# Patient Record
Sex: FEMALE | Race: WHITE | NOT HISPANIC OR LATINO | Employment: FULL TIME | URBAN - METROPOLITAN AREA
[De-identification: names, ages, dates, MRNs, and addresses within clinical notes are randomized per-mention and may not be internally consistent; named-entity substitution may affect disease eponyms.]

---
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
## 2021-01-26 DIAGNOSIS — O36.5920 MATERNAL CARE FOR POOR FETAL GROWTH IN SECOND TRIMESTER, SINGLE OR UNSPECIFIED FETUS: Primary | ICD-10-CM

## 2021-01-27 ENCOUNTER — PROCEDURE VISIT (OUTPATIENT)
Dept: MATERNAL FETAL MEDICINE | Facility: CLINIC | Age: 21
End: 2021-01-27
Payer: MEDICAID

## 2021-01-27 ENCOUNTER — OFFICE VISIT (OUTPATIENT)
Dept: MATERNAL FETAL MEDICINE | Facility: CLINIC | Age: 21
End: 2021-01-27
Payer: MEDICAID

## 2021-01-27 VITALS
SYSTOLIC BLOOD PRESSURE: 100 MMHG | WEIGHT: 178.56 LBS | BODY MASS INDEX: 27.97 KG/M2 | DIASTOLIC BLOOD PRESSURE: 70 MMHG

## 2021-01-27 DIAGNOSIS — Z03.74 SUSPECTED PROBLEM WITH FETAL GROWTH NOT FOUND: ICD-10-CM

## 2021-01-27 DIAGNOSIS — O36.5920 MATERNAL CARE FOR POOR FETAL GROWTH IN SECOND TRIMESTER, SINGLE OR UNSPECIFIED FETUS: ICD-10-CM

## 2021-01-27 PROCEDURE — 76811 PR US, OB FETAL EVAL & EXAM, TRANSABDOM,FIRST GESTATION: ICD-10-PCS | Mod: 26,S$PBB,, | Performed by: OBSTETRICS & GYNECOLOGY

## 2021-01-27 PROCEDURE — 76811 OB US DETAILED SNGL FETUS: CPT | Mod: PBBFAC | Performed by: OBSTETRICS & GYNECOLOGY

## 2021-01-27 PROCEDURE — 99999 PR PBB SHADOW E&M-EST. PATIENT-LVL III: ICD-10-PCS | Mod: PBBFAC,,, | Performed by: OBSTETRICS & GYNECOLOGY

## 2021-01-27 PROCEDURE — 99999 PR PBB SHADOW E&M-EST. PATIENT-LVL III: CPT | Mod: PBBFAC,,, | Performed by: OBSTETRICS & GYNECOLOGY

## 2021-01-27 PROCEDURE — 99203 OFFICE O/P NEW LOW 30 MIN: CPT | Mod: 25,S$PBB,, | Performed by: OBSTETRICS & GYNECOLOGY

## 2021-01-27 PROCEDURE — 99203 PR OFFICE/OUTPT VISIT, NEW, LEVL III, 30-44 MIN: ICD-10-PCS | Mod: 25,S$PBB,, | Performed by: OBSTETRICS & GYNECOLOGY

## 2021-01-27 PROCEDURE — 76811 OB US DETAILED SNGL FETUS: CPT | Mod: 26,S$PBB,, | Performed by: OBSTETRICS & GYNECOLOGY

## 2021-01-27 PROCEDURE — 99213 OFFICE O/P EST LOW 20 MIN: CPT | Mod: PBBFAC,25 | Performed by: OBSTETRICS & GYNECOLOGY

## 2021-02-24 ENCOUNTER — OFFICE VISIT (OUTPATIENT)
Dept: MATERNAL FETAL MEDICINE | Facility: CLINIC | Age: 21
End: 2021-02-24
Payer: MEDICAID

## 2021-02-24 ENCOUNTER — PROCEDURE VISIT (OUTPATIENT)
Dept: MATERNAL FETAL MEDICINE | Facility: CLINIC | Age: 21
End: 2021-02-24
Payer: MEDICAID

## 2021-02-24 VITALS
SYSTOLIC BLOOD PRESSURE: 104 MMHG | DIASTOLIC BLOOD PRESSURE: 64 MMHG | BODY MASS INDEX: 28.76 KG/M2 | WEIGHT: 183.63 LBS

## 2021-02-24 DIAGNOSIS — O36.5920 MATERNAL CARE FOR POOR FETAL GROWTH IN SECOND TRIMESTER, SINGLE OR UNSPECIFIED FETUS: ICD-10-CM

## 2021-02-24 DIAGNOSIS — Z03.74 SUSPECTED PROBLEM WITH FETAL GROWTH NOT FOUND: ICD-10-CM

## 2021-02-24 PROCEDURE — 99999 PR PBB SHADOW E&M-EST. PATIENT-LVL III: ICD-10-PCS | Mod: PBBFAC,,, | Performed by: OBSTETRICS & GYNECOLOGY

## 2021-02-24 PROCEDURE — 99214 OFFICE O/P EST MOD 30 MIN: CPT | Mod: 25,S$PBB,, | Performed by: OBSTETRICS & GYNECOLOGY

## 2021-02-24 PROCEDURE — 76819 PR US, OB, FETAL BIOPHYSICAL, W/O NST: ICD-10-PCS | Mod: 26,S$PBB,, | Performed by: OBSTETRICS & GYNECOLOGY

## 2021-02-24 PROCEDURE — 76819 FETAL BIOPHYS PROFIL W/O NST: CPT | Mod: 26,S$PBB,, | Performed by: OBSTETRICS & GYNECOLOGY

## 2021-02-24 PROCEDURE — 99999 PR PBB SHADOW E&M-EST. PATIENT-LVL III: CPT | Mod: PBBFAC,,, | Performed by: OBSTETRICS & GYNECOLOGY

## 2021-02-24 PROCEDURE — 99213 OFFICE O/P EST LOW 20 MIN: CPT | Mod: PBBFAC,25 | Performed by: OBSTETRICS & GYNECOLOGY

## 2021-02-24 PROCEDURE — 76816 OB US FOLLOW-UP PER FETUS: CPT | Mod: PBBFAC | Performed by: OBSTETRICS & GYNECOLOGY

## 2021-02-24 PROCEDURE — 76819 FETAL BIOPHYS PROFIL W/O NST: CPT | Mod: PBBFAC | Performed by: OBSTETRICS & GYNECOLOGY

## 2021-02-24 PROCEDURE — 76816 OB US FOLLOW-UP PER FETUS: CPT | Mod: 26,S$PBB,, | Performed by: OBSTETRICS & GYNECOLOGY

## 2021-02-24 PROCEDURE — 76816 PR  US,PREGNANT UTERUS,F/U,TRANSABD APP: ICD-10-PCS | Mod: 26,S$PBB,, | Performed by: OBSTETRICS & GYNECOLOGY

## 2021-02-24 PROCEDURE — 99214 PR OFFICE/OUTPT VISIT, EST, LEVL IV, 30-39 MIN: ICD-10-PCS | Mod: 25,S$PBB,, | Performed by: OBSTETRICS & GYNECOLOGY

## 2021-03-12 ENCOUNTER — HOSPITAL ENCOUNTER (OUTPATIENT)
Facility: HOSPITAL | Age: 21
Discharge: HOME | End: 2021-03-12
Attending: SPECIALIST | Admitting: SPECIALIST
Payer: MEDICAID

## 2021-03-12 VITALS — HEART RATE: 82 BPM | DIASTOLIC BLOOD PRESSURE: 63 MMHG | SYSTOLIC BLOOD PRESSURE: 117 MMHG

## 2021-03-12 DIAGNOSIS — N93.9 VAGINAL BLEEDING: ICD-10-CM

## 2021-03-12 PROCEDURE — 99211 OFF/OP EST MAY X REQ PHY/QHP: CPT

## 2021-03-12 RX ORDER — PROCHLORPERAZINE EDISYLATE 5 MG/ML
5 INJECTION INTRAMUSCULAR; INTRAVENOUS EVERY 6 HOURS PRN
Status: DISCONTINUED | OUTPATIENT
Start: 2021-03-12 | End: 2021-03-12 | Stop reason: HOSPADM

## 2021-03-31 LAB — PRENATAL STREP B CULTURE: NEGATIVE

## 2021-04-05 ENCOUNTER — HOSPITAL ENCOUNTER (OUTPATIENT)
Facility: HOSPITAL | Age: 21
Discharge: HOME | End: 2021-04-06
Attending: SPECIALIST | Admitting: OBSTETRICS & GYNECOLOGY
Payer: MEDICAID

## 2021-04-05 DIAGNOSIS — O26.899 ABDOMINAL PAIN IN PREGNANCY: ICD-10-CM

## 2021-04-05 DIAGNOSIS — R10.9 ABDOMINAL PAIN IN PREGNANCY: ICD-10-CM

## 2021-04-05 LAB
BACTERIA #/AREA URNS HPF: ABNORMAL /HPF
BILIRUB UR QL STRIP: NEGATIVE
CLARITY UR: ABNORMAL
COLOR UR: YELLOW
GLUCOSE UR QL STRIP: NEGATIVE
HGB UR QL STRIP: ABNORMAL
HYALINE CASTS #/AREA URNS LPF: 34 /LPF
KETONES UR QL STRIP: ABNORMAL
LEUKOCYTE ESTERASE UR QL STRIP: ABNORMAL
MICROSCOPIC COMMENT: ABNORMAL
NITRITE UR QL STRIP: POSITIVE
PH UR STRIP: 7 [PH] (ref 5–8)
PROT UR QL STRIP: ABNORMAL
RBC #/AREA URNS HPF: 23 /HPF (ref 0–4)
SP GR UR STRIP: 1.01 (ref 1–1.03)
SQUAMOUS #/AREA URNS HPF: 1 /HPF
URN SPEC COLLECT METH UR: ABNORMAL
UROBILINOGEN UR STRIP-ACNC: NEGATIVE EU/DL
WBC #/AREA URNS HPF: >100 /HPF (ref 0–5)

## 2021-04-05 PROCEDURE — 87147 CULTURE TYPE IMMUNOLOGIC: CPT | Mod: 59 | Performed by: OBSTETRICS & GYNECOLOGY

## 2021-04-05 PROCEDURE — 87077 CULTURE AEROBIC IDENTIFY: CPT | Performed by: OBSTETRICS & GYNECOLOGY

## 2021-04-05 PROCEDURE — 87086 URINE CULTURE/COLONY COUNT: CPT | Performed by: OBSTETRICS & GYNECOLOGY

## 2021-04-05 PROCEDURE — 25000003 PHARM REV CODE 250: Performed by: OBSTETRICS & GYNECOLOGY

## 2021-04-05 PROCEDURE — 63600175 PHARM REV CODE 636 W HCPCS: Performed by: OBSTETRICS & GYNECOLOGY

## 2021-04-05 PROCEDURE — 99211 OFF/OP EST MAY X REQ PHY/QHP: CPT

## 2021-04-05 PROCEDURE — 81001 URINALYSIS AUTO W/SCOPE: CPT | Performed by: OBSTETRICS & GYNECOLOGY

## 2021-04-05 PROCEDURE — 87186 SC STD MICRODIL/AGAR DIL: CPT | Performed by: OBSTETRICS & GYNECOLOGY

## 2021-04-05 RX ORDER — SODIUM CHLORIDE, SODIUM LACTATE, POTASSIUM CHLORIDE, CALCIUM CHLORIDE 600; 310; 30; 20 MG/100ML; MG/100ML; MG/100ML; MG/100ML
INJECTION, SOLUTION INTRAVENOUS CONTINUOUS
Status: DISCONTINUED | OUTPATIENT
Start: 2021-04-05 | End: 2021-04-06 | Stop reason: HOSPADM

## 2021-04-05 RX ORDER — ACETAMINOPHEN 325 MG/1
325 TABLET ORAL EVERY 6 HOURS PRN
Status: ON HOLD | COMMUNITY
End: 2021-04-23 | Stop reason: HOSPADM

## 2021-04-05 RX ORDER — CEFAZOLIN SODIUM 1 G/50ML
1 SOLUTION INTRAVENOUS
Status: DISCONTINUED | OUTPATIENT
Start: 2021-04-05 | End: 2021-04-06 | Stop reason: HOSPADM

## 2021-04-05 RX ORDER — NALBUPHINE HYDROCHLORIDE 10 MG/ML
10 INJECTION, SOLUTION INTRAMUSCULAR; INTRAVENOUS; SUBCUTANEOUS
Status: DISCONTINUED | OUTPATIENT
Start: 2021-04-05 | End: 2021-04-06 | Stop reason: HOSPADM

## 2021-04-05 RX ADMIN — NALBUPHINE HYDROCHLORIDE 10 MG: 10 INJECTION, SOLUTION INTRAMUSCULAR; INTRAVENOUS; SUBCUTANEOUS at 08:04

## 2021-04-05 RX ADMIN — SODIUM CHLORIDE, SODIUM LACTATE, POTASSIUM CHLORIDE, AND CALCIUM CHLORIDE: .6; .31; .03; .02 INJECTION, SOLUTION INTRAVENOUS at 08:04

## 2021-04-05 RX ADMIN — PROMETHAZINE HYDROCHLORIDE 12.5 MG: 25 INJECTION INTRAMUSCULAR; INTRAVENOUS at 08:04

## 2021-04-05 RX ADMIN — CEFAZOLIN SODIUM 1 G: 1 SOLUTION INTRAVENOUS at 09:04

## 2021-04-06 VITALS
SYSTOLIC BLOOD PRESSURE: 109 MMHG | DIASTOLIC BLOOD PRESSURE: 59 MMHG | TEMPERATURE: 98 F | WEIGHT: 193 LBS | BODY MASS INDEX: 30.29 KG/M2 | HEART RATE: 85 BPM | HEIGHT: 67 IN | OXYGEN SATURATION: 98 % | RESPIRATION RATE: 17 BRPM

## 2021-04-06 PROCEDURE — 63600175 PHARM REV CODE 636 W HCPCS: Performed by: OBSTETRICS & GYNECOLOGY

## 2021-04-06 PROCEDURE — 99211 OFF/OP EST MAY X REQ PHY/QHP: CPT

## 2021-04-06 RX ADMIN — CEFAZOLIN SODIUM 1 G: 1 SOLUTION INTRAVENOUS at 05:04

## 2021-04-07 LAB — BACTERIA UR CULT: ABNORMAL

## 2021-04-14 ENCOUNTER — HOSPITAL ENCOUNTER (OUTPATIENT)
Facility: HOSPITAL | Age: 21
Discharge: HOME | End: 2021-04-14
Attending: SPECIALIST | Admitting: SPECIALIST
Payer: MEDICAID

## 2021-04-14 VITALS — SYSTOLIC BLOOD PRESSURE: 112 MMHG | DIASTOLIC BLOOD PRESSURE: 73 MMHG | HEART RATE: 78 BPM

## 2021-04-14 DIAGNOSIS — O36.5990 IUGR, ANTENATAL: ICD-10-CM

## 2021-04-14 PROCEDURE — 59025 FETAL NON-STRESS TEST: CPT

## 2021-04-19 ENCOUNTER — HOSPITAL ENCOUNTER (OUTPATIENT)
Facility: HOSPITAL | Age: 21
Discharge: HOME | End: 2021-04-19
Attending: SPECIALIST | Admitting: SPECIALIST
Payer: MEDICAID

## 2021-04-19 VITALS — HEART RATE: 103 BPM | SYSTOLIC BLOOD PRESSURE: 117 MMHG | DIASTOLIC BLOOD PRESSURE: 76 MMHG | TEMPERATURE: 98 F

## 2021-04-19 DIAGNOSIS — N89.8 VAGINAL DISCHARGE: ICD-10-CM

## 2021-04-19 PROCEDURE — 99211 OFF/OP EST MAY X REQ PHY/QHP: CPT

## 2021-04-22 ENCOUNTER — ANESTHESIA (OUTPATIENT)
Dept: OBSTETRICS AND GYNECOLOGY | Facility: HOSPITAL | Age: 21
End: 2021-04-22
Payer: MEDICAID

## 2021-04-22 ENCOUNTER — ANESTHESIA EVENT (OUTPATIENT)
Dept: OBSTETRICS AND GYNECOLOGY | Facility: HOSPITAL | Age: 21
End: 2021-04-22
Payer: MEDICAID

## 2021-04-22 ENCOUNTER — HOSPITAL ENCOUNTER (INPATIENT)
Facility: HOSPITAL | Age: 21
LOS: 2 days | Discharge: HOME | End: 2021-04-24
Attending: SPECIALIST | Admitting: SPECIALIST
Payer: MEDICAID

## 2021-04-22 DIAGNOSIS — Z34.90 ENCOUNTER FOR ELECTIVE INDUCTION OF LABOR: ICD-10-CM

## 2021-04-22 LAB
ABO + RH BLD: NORMAL
AMPHET+METHAMPHET UR QL: NEGATIVE
BACTERIA #/AREA URNS HPF: ABNORMAL /HPF
BARBITURATES UR QL SCN>200 NG/ML: NEGATIVE
BASOPHILS # BLD AUTO: 0.03 K/UL (ref 0–0.2)
BASOPHILS NFR BLD: 0.3 % (ref 0–1.9)
BENZODIAZ UR QL SCN>200 NG/ML: NEGATIVE
BILIRUB UR QL STRIP: NEGATIVE
BLD GP AB SCN CELLS X3 SERPL QL: NORMAL
BUPRENORPHINE UR QL: NEGATIVE
BZE UR QL SCN: NEGATIVE
CANNABINOIDS UR QL SCN: NORMAL
CLARITY UR: CLEAR
COLOR UR: YELLOW
CREAT UR-MCNC: 108 MG/DL (ref 15–325)
DIFFERENTIAL METHOD: ABNORMAL
EOSINOPHIL # BLD AUTO: 0.1 K/UL (ref 0–0.5)
EOSINOPHIL NFR BLD: 0.7 % (ref 0–8)
ERYTHROCYTE [DISTWIDTH] IN BLOOD BY AUTOMATED COUNT: 12.8 % (ref 11.5–14.5)
GLUCOSE UR QL STRIP: NEGATIVE
HCT VFR BLD AUTO: 32.7 % (ref 37–48.5)
HGB BLD-MCNC: 10.8 G/DL (ref 12–16)
HGB UR QL STRIP: ABNORMAL
HYALINE CASTS #/AREA URNS LPF: 0 /LPF
IMM GRANULOCYTES # BLD AUTO: 0.08 K/UL (ref 0–0.04)
IMM GRANULOCYTES NFR BLD AUTO: 0.7 % (ref 0–0.5)
KETONES UR QL STRIP: ABNORMAL
LEUKOCYTE ESTERASE UR QL STRIP: ABNORMAL
LYMPHOCYTES # BLD AUTO: 2.5 K/UL (ref 1–4.8)
LYMPHOCYTES NFR BLD: 23.6 % (ref 18–48)
MCH RBC QN AUTO: 26.7 PG (ref 27–31)
MCHC RBC AUTO-ENTMCNC: 33 G/DL (ref 32–36)
MCV RBC AUTO: 81 FL (ref 82–98)
MICROSCOPIC COMMENT: ABNORMAL
MONOCYTES # BLD AUTO: 1 K/UL (ref 0.3–1)
MONOCYTES NFR BLD: 9.1 % (ref 4–15)
NEUTROPHILS # BLD AUTO: 7 K/UL (ref 1.8–7.7)
NEUTROPHILS NFR BLD: 65.6 % (ref 38–73)
NITRITE UR QL STRIP: NEGATIVE
NRBC BLD-RTO: 0 /100 WBC
OPIATES UR QL SCN: NEGATIVE
PCP UR QL SCN>25 NG/ML: NEGATIVE
PH UR STRIP: 7 [PH] (ref 5–8)
PLATELET # BLD AUTO: 328 K/UL (ref 150–450)
PMV BLD AUTO: 10.7 FL (ref 9.2–12.9)
PROT UR QL STRIP: ABNORMAL
RBC # BLD AUTO: 4.04 M/UL (ref 4–5.4)
RBC #/AREA URNS HPF: 10 /HPF (ref 0–4)
RPR SER QL: NORMAL
SARS-COV-2 RDRP RESP QL NAA+PROBE: NEGATIVE
SP GR UR STRIP: 1.02 (ref 1–1.03)
SQUAMOUS #/AREA URNS HPF: 1 /HPF
TOXICOLOGY INFORMATION: NORMAL
URN SPEC COLLECT METH UR: ABNORMAL
UROBILINOGEN UR STRIP-ACNC: 1 EU/DL
WBC # BLD AUTO: 10.72 K/UL (ref 3.9–12.7)
WBC #/AREA URNS HPF: 20 /HPF (ref 0–5)

## 2021-04-22 PROCEDURE — U0002 COVID-19 LAB TEST NON-CDC: HCPCS | Performed by: SPECIALIST

## 2021-04-22 PROCEDURE — 63600175 PHARM REV CODE 636 W HCPCS: Performed by: SPECIALIST

## 2021-04-22 PROCEDURE — 72200004 HC VAGINAL DELIVERY LEVEL I

## 2021-04-22 PROCEDURE — 87077 CULTURE AEROBIC IDENTIFY: CPT | Performed by: SPECIALIST

## 2021-04-22 PROCEDURE — 25000003 PHARM REV CODE 250: Performed by: SPECIALIST

## 2021-04-22 PROCEDURE — 86592 SYPHILIS TEST NON-TREP QUAL: CPT | Performed by: SPECIALIST

## 2021-04-22 PROCEDURE — 85025 COMPLETE CBC W/AUTO DIFF WBC: CPT | Performed by: SPECIALIST

## 2021-04-22 PROCEDURE — 62326 NJX INTERLAMINAR LMBR/SAC: CPT | Performed by: ANESTHESIOLOGY

## 2021-04-22 PROCEDURE — 86900 BLOOD TYPING SEROLOGIC ABO: CPT | Performed by: SPECIALIST

## 2021-04-22 PROCEDURE — 87086 URINE CULTURE/COLONY COUNT: CPT | Performed by: SPECIALIST

## 2021-04-22 PROCEDURE — 63600175 PHARM REV CODE 636 W HCPCS: Performed by: ANESTHESIOLOGY

## 2021-04-22 PROCEDURE — 80307 DRUG TEST PRSMV CHEM ANLYZR: CPT | Performed by: SPECIALIST

## 2021-04-22 PROCEDURE — 81001 URINALYSIS AUTO W/SCOPE: CPT | Performed by: SPECIALIST

## 2021-04-22 PROCEDURE — 87186 SC STD MICRODIL/AGAR DIL: CPT | Performed by: SPECIALIST

## 2021-04-22 PROCEDURE — 12000002 HC ACUTE/MED SURGE SEMI-PRIVATE ROOM

## 2021-04-22 RX ORDER — OXYCODONE AND ACETAMINOPHEN 5; 325 MG/1; MG/1
1 TABLET ORAL EVERY 4 HOURS PRN
Status: DISCONTINUED | OUTPATIENT
Start: 2021-04-22 | End: 2021-04-24 | Stop reason: HOSPADM

## 2021-04-22 RX ORDER — DIPHENHYDRAMINE HCL 25 MG
25 CAPSULE ORAL EVERY 4 HOURS PRN
Status: DISCONTINUED | OUTPATIENT
Start: 2021-04-22 | End: 2021-04-24 | Stop reason: HOSPADM

## 2021-04-22 RX ORDER — FENTANYL/BUPIVACAINE/NS/PF 2-625MCG/1
14 PLASTIC BAG, INJECTION (ML) INJECTION CONTINUOUS
Status: DISCONTINUED | OUTPATIENT
Start: 2021-04-22 | End: 2021-04-22

## 2021-04-22 RX ORDER — SODIUM CHLORIDE 9 MG/ML
INJECTION, SOLUTION INTRAVENOUS
Status: DISCONTINUED | OUTPATIENT
Start: 2021-04-22 | End: 2021-04-22

## 2021-04-22 RX ORDER — ONDANSETRON 2 MG/ML
4 INJECTION INTRAMUSCULAR; INTRAVENOUS EVERY 6 HOURS PRN
Status: DISCONTINUED | OUTPATIENT
Start: 2021-04-22 | End: 2021-04-22

## 2021-04-22 RX ORDER — OXYTOCIN-SODIUM CHLORIDE 0.9% IV SOLN 30 UNIT/500ML 30-0.9/5 UT/ML-%
30 SOLUTION INTRAVENOUS ONCE
Status: DISCONTINUED | OUTPATIENT
Start: 2021-04-22 | End: 2021-04-24 | Stop reason: HOSPADM

## 2021-04-22 RX ORDER — NALOXONE HCL 0.4 MG/ML
0.4 VIAL (ML) INJECTION SEE ADMIN INSTRUCTIONS
Status: DISCONTINUED | OUTPATIENT
Start: 2021-04-22 | End: 2021-04-22

## 2021-04-22 RX ORDER — METHYLERGONOVINE MALEATE 0.2 MG/ML
200 INJECTION INTRAVENOUS
Status: DISCONTINUED | OUTPATIENT
Start: 2021-04-22 | End: 2021-04-22

## 2021-04-22 RX ORDER — FENTANYL/BUPIVACAINE/NS/PF 2-625MCG/1
PLASTIC BAG, INJECTION (ML) INJECTION
Status: DISPENSED
Start: 2021-04-22 | End: 2021-04-23

## 2021-04-22 RX ORDER — OXYTOCIN-SODIUM CHLORIDE 0.9% IV SOLN 30 UNIT/500ML 30-0.9/5 UT/ML-%
0-30 SOLUTION INTRAVENOUS CONTINUOUS
Status: DISCONTINUED | OUTPATIENT
Start: 2021-04-22 | End: 2021-04-22

## 2021-04-22 RX ORDER — DOCUSATE SODIUM 100 MG/1
200 CAPSULE, LIQUID FILLED ORAL 2 TIMES DAILY PRN
Status: DISCONTINUED | OUTPATIENT
Start: 2021-04-22 | End: 2021-04-24 | Stop reason: HOSPADM

## 2021-04-22 RX ORDER — SODIUM CHLORIDE, SODIUM LACTATE, POTASSIUM CHLORIDE, CALCIUM CHLORIDE 600; 310; 30; 20 MG/100ML; MG/100ML; MG/100ML; MG/100ML
INJECTION, SOLUTION INTRAVENOUS CONTINUOUS
Status: DISCONTINUED | OUTPATIENT
Start: 2021-04-22 | End: 2021-04-22

## 2021-04-22 RX ORDER — DIPHENHYDRAMINE HYDROCHLORIDE 50 MG/ML
12.5 INJECTION INTRAMUSCULAR; INTRAVENOUS EVERY 4 HOURS PRN
Status: DISCONTINUED | OUTPATIENT
Start: 2021-04-22 | End: 2021-04-22

## 2021-04-22 RX ORDER — PROMETHAZINE HYDROCHLORIDE 25 MG/1
25 TABLET ORAL EVERY 6 HOURS PRN
Status: DISCONTINUED | OUTPATIENT
Start: 2021-04-22 | End: 2021-04-24 | Stop reason: HOSPADM

## 2021-04-22 RX ORDER — BUTORPHANOL TARTRATE 2 MG/ML
2 INJECTION INTRAMUSCULAR; INTRAVENOUS ONCE
Status: COMPLETED | OUTPATIENT
Start: 2021-04-22 | End: 2021-04-22

## 2021-04-22 RX ORDER — EPHEDRINE SULFATE 50 MG/ML
10 INJECTION, SOLUTION INTRAVENOUS ONCE AS NEEDED
Status: DISCONTINUED | OUTPATIENT
Start: 2021-04-22 | End: 2021-04-22

## 2021-04-22 RX ORDER — DIPHENHYDRAMINE HYDROCHLORIDE 50 MG/ML
25 INJECTION INTRAMUSCULAR; INTRAVENOUS EVERY 4 HOURS PRN
Status: DISCONTINUED | OUTPATIENT
Start: 2021-04-22 | End: 2021-04-24 | Stop reason: HOSPADM

## 2021-04-22 RX ORDER — CARBOPROST TROMETHAMINE 250 UG/ML
250 INJECTION, SOLUTION INTRAMUSCULAR
Status: DISCONTINUED | OUTPATIENT
Start: 2021-04-22 | End: 2021-04-22

## 2021-04-22 RX ORDER — OXYCODONE AND ACETAMINOPHEN 10; 325 MG/1; MG/1
1 TABLET ORAL EVERY 4 HOURS PRN
Status: DISCONTINUED | OUTPATIENT
Start: 2021-04-22 | End: 2021-04-24 | Stop reason: HOSPADM

## 2021-04-22 RX ORDER — ROPIVACAINE HYDROCHLORIDE 2 MG/ML
INJECTION, SOLUTION EPIDURAL; INFILTRATION
Status: DISCONTINUED | OUTPATIENT
Start: 2021-04-22 | End: 2021-04-22

## 2021-04-22 RX ORDER — HYDROCORTISONE 25 MG/G
CREAM TOPICAL 3 TIMES DAILY PRN
Status: DISCONTINUED | OUTPATIENT
Start: 2021-04-22 | End: 2021-04-24 | Stop reason: HOSPADM

## 2021-04-22 RX ADMIN — ROPIVACAINE HYDROCHLORIDE 5 ML: 2 INJECTION, SOLUTION EPIDURAL; INFILTRATION at 01:04

## 2021-04-22 RX ADMIN — PROMETHAZINE HYDROCHLORIDE 12.5 MG: 25 INJECTION INTRAMUSCULAR; INTRAVENOUS at 09:04

## 2021-04-22 RX ADMIN — Medication 2 MILLI-UNITS/MIN: at 09:04

## 2021-04-22 RX ADMIN — BUTORPHANOL TARTRATE 2 MG: 2 INJECTION, SOLUTION INTRAMUSCULAR; INTRAVENOUS at 09:04

## 2021-04-22 RX ADMIN — SODIUM CHLORIDE, SODIUM LACTATE, POTASSIUM CHLORIDE, AND CALCIUM CHLORIDE: .6; .31; .03; .02 INJECTION, SOLUTION INTRAVENOUS at 09:04

## 2021-04-22 RX ADMIN — IBUPROFEN 600 MG: 400 TABLET, FILM COATED ORAL at 09:04

## 2021-04-23 LAB
BASOPHILS # BLD AUTO: 0.02 K/UL (ref 0–0.2)
BASOPHILS NFR BLD: 0.1 % (ref 0–1.9)
DIFFERENTIAL METHOD: ABNORMAL
EOSINOPHIL # BLD AUTO: 0 K/UL (ref 0–0.5)
EOSINOPHIL NFR BLD: 0.3 % (ref 0–8)
ERYTHROCYTE [DISTWIDTH] IN BLOOD BY AUTOMATED COUNT: 12.7 % (ref 11.5–14.5)
HCT VFR BLD AUTO: 31 % (ref 37–48.5)
HGB BLD-MCNC: 10.2 G/DL (ref 12–16)
IMM GRANULOCYTES # BLD AUTO: 0.08 K/UL (ref 0–0.04)
IMM GRANULOCYTES NFR BLD AUTO: 0.6 % (ref 0–0.5)
LYMPHOCYTES # BLD AUTO: 2 K/UL (ref 1–4.8)
LYMPHOCYTES NFR BLD: 14.3 % (ref 18–48)
MCH RBC QN AUTO: 26.9 PG (ref 27–31)
MCHC RBC AUTO-ENTMCNC: 32.9 G/DL (ref 32–36)
MCV RBC AUTO: 82 FL (ref 82–98)
MONOCYTES # BLD AUTO: 1.3 K/UL (ref 0.3–1)
MONOCYTES NFR BLD: 8.8 % (ref 4–15)
NEUTROPHILS # BLD AUTO: 10.9 K/UL (ref 1.8–7.7)
NEUTROPHILS NFR BLD: 75.9 % (ref 38–73)
NRBC BLD-RTO: 0 /100 WBC
PLATELET # BLD AUTO: 296 K/UL (ref 150–450)
PMV BLD AUTO: 9.7 FL (ref 9.2–12.9)
RBC # BLD AUTO: 3.79 M/UL (ref 4–5.4)
WBC # BLD AUTO: 14.29 K/UL (ref 3.9–12.7)

## 2021-04-23 PROCEDURE — 85025 COMPLETE CBC W/AUTO DIFF WBC: CPT | Performed by: SPECIALIST

## 2021-04-23 PROCEDURE — 25000003 PHARM REV CODE 250: Performed by: SPECIALIST

## 2021-04-23 PROCEDURE — 12000002 HC ACUTE/MED SURGE SEMI-PRIVATE ROOM

## 2021-04-23 PROCEDURE — 36415 COLL VENOUS BLD VENIPUNCTURE: CPT | Performed by: SPECIALIST

## 2021-04-23 RX ORDER — IBUPROFEN 600 MG/1
600 TABLET ORAL EVERY 6 HOURS PRN
Refills: 0
Start: 2021-04-23 | End: 2022-03-06

## 2021-04-23 RX ADMIN — OXYCODONE AND ACETAMINOPHEN 1 TABLET: 5; 325 TABLET ORAL at 10:04

## 2021-04-23 RX ADMIN — IBUPROFEN 600 MG: 400 TABLET, FILM COATED ORAL at 08:04

## 2021-04-23 RX ADMIN — Medication: at 12:04

## 2021-04-23 RX ADMIN — BENZOCAINE AND LEVOMENTHOL: 200; 5 SPRAY TOPICAL at 12:04

## 2021-04-23 RX ADMIN — IBUPROFEN 600 MG: 400 TABLET, FILM COATED ORAL at 10:04

## 2021-04-23 RX ADMIN — OXYCODONE AND ACETAMINOPHEN 1 TABLET: 5; 325 TABLET ORAL at 01:04

## 2021-04-23 RX ADMIN — DOCUSATE SODIUM 200 MG: 100 CAPSULE, LIQUID FILLED ORAL at 09:04

## 2021-04-23 RX ADMIN — IBUPROFEN 600 MG: 400 TABLET, FILM COATED ORAL at 03:04

## 2021-04-24 VITALS
HEIGHT: 67 IN | HEART RATE: 82 BPM | RESPIRATION RATE: 16 BRPM | DIASTOLIC BLOOD PRESSURE: 79 MMHG | TEMPERATURE: 98 F | WEIGHT: 197 LBS | SYSTOLIC BLOOD PRESSURE: 113 MMHG | OXYGEN SATURATION: 98 % | BODY MASS INDEX: 30.92 KG/M2

## 2021-04-24 LAB — BACTERIA UR CULT: ABNORMAL

## 2021-04-24 PROCEDURE — 25000003 PHARM REV CODE 250: Performed by: SPECIALIST

## 2021-04-24 RX ADMIN — IBUPROFEN 600 MG: 400 TABLET, FILM COATED ORAL at 02:04

## 2021-04-24 RX ADMIN — IBUPROFEN 600 MG: 400 TABLET, FILM COATED ORAL at 10:04

## 2021-07-11 ENCOUNTER — OFFICE VISIT (OUTPATIENT)
Dept: URGENT CARE | Facility: CLINIC | Age: 21
End: 2021-07-11
Payer: MEDICAID

## 2021-07-11 VITALS
RESPIRATION RATE: 18 BRPM | OXYGEN SATURATION: 99 % | DIASTOLIC BLOOD PRESSURE: 76 MMHG | SYSTOLIC BLOOD PRESSURE: 110 MMHG | WEIGHT: 179.38 LBS | HEIGHT: 67 IN | BODY MASS INDEX: 28.16 KG/M2 | HEART RATE: 77 BPM | TEMPERATURE: 99 F

## 2021-07-11 DIAGNOSIS — R11.0 NAUSEA: ICD-10-CM

## 2021-07-11 DIAGNOSIS — R51.9 ACUTE NONINTRACTABLE HEADACHE, UNSPECIFIED HEADACHE TYPE: Primary | ICD-10-CM

## 2021-07-11 LAB
B-HCG UR QL: NEGATIVE
CTP QC/QA: YES
CTP QC/QA: YES
SARS-COV-2 RDRP RESP QL NAA+PROBE: NEGATIVE

## 2021-07-11 PROCEDURE — 81025 POCT URINE PREGNANCY: ICD-10-PCS | Mod: S$GLB,,, | Performed by: NURSE PRACTITIONER

## 2021-07-11 PROCEDURE — 99213 PR OFFICE/OUTPT VISIT, EST, LEVL III, 20-29 MIN: ICD-10-PCS | Mod: S$GLB,,, | Performed by: NURSE PRACTITIONER

## 2021-07-11 PROCEDURE — 87635 SARS-COV-2 COVID-19 AMP PRB: CPT | Mod: QW,S$GLB,, | Performed by: NURSE PRACTITIONER

## 2021-07-11 PROCEDURE — 99213 OFFICE O/P EST LOW 20 MIN: CPT | Mod: S$GLB,,, | Performed by: NURSE PRACTITIONER

## 2021-07-11 PROCEDURE — 81025 URINE PREGNANCY TEST: CPT | Mod: S$GLB,,, | Performed by: NURSE PRACTITIONER

## 2021-07-11 PROCEDURE — 87635 PR SARS-COV-2 COVID-19 AMPLIFIED PROBE: ICD-10-PCS | Mod: QW,S$GLB,, | Performed by: NURSE PRACTITIONER

## 2021-07-11 RX ORDER — BUTALBITAL, ACETAMINOPHEN AND CAFFEINE 50; 325; 40 MG/1; MG/1; MG/1
1 TABLET ORAL EVERY 4 HOURS PRN
Qty: 20 TABLET | Refills: 0 | Status: SHIPPED | OUTPATIENT
Start: 2021-07-11

## 2021-07-11 RX ORDER — ONDANSETRON 4 MG/1
4 TABLET, ORALLY DISINTEGRATING ORAL EVERY 6 HOURS PRN
Qty: 20 TABLET | Refills: 0 | OUTPATIENT
Start: 2021-07-11 | End: 2022-03-06

## 2021-07-11 RX ORDER — DEXAMETHASONE SODIUM PHOSPHATE 4 MG/ML
8 INJECTION, SOLUTION INTRA-ARTICULAR; INTRALESIONAL; INTRAMUSCULAR; INTRAVENOUS; SOFT TISSUE
Status: COMPLETED | OUTPATIENT
Start: 2021-07-11 | End: 2021-07-11

## 2021-07-11 RX ORDER — KETOROLAC TROMETHAMINE 30 MG/ML
30 INJECTION, SOLUTION INTRAMUSCULAR; INTRAVENOUS
Status: COMPLETED | OUTPATIENT
Start: 2021-07-11 | End: 2021-07-11

## 2021-07-11 RX ADMIN — KETOROLAC TROMETHAMINE 30 MG: 30 INJECTION, SOLUTION INTRAMUSCULAR; INTRAVENOUS at 04:07

## 2021-07-11 RX ADMIN — DEXAMETHASONE SODIUM PHOSPHATE 8 MG: 4 INJECTION, SOLUTION INTRA-ARTICULAR; INTRALESIONAL; INTRAMUSCULAR; INTRAVENOUS; SOFT TISSUE at 04:07

## 2021-07-21 ENCOUNTER — OFFICE VISIT (OUTPATIENT)
Dept: URGENT CARE | Facility: CLINIC | Age: 21
End: 2021-07-21
Payer: MEDICAID

## 2021-07-21 VITALS
HEART RATE: 101 BPM | OXYGEN SATURATION: 98 % | HEIGHT: 67 IN | RESPIRATION RATE: 18 BRPM | TEMPERATURE: 99 F | DIASTOLIC BLOOD PRESSURE: 79 MMHG | SYSTOLIC BLOOD PRESSURE: 122 MMHG | WEIGHT: 185 LBS | BODY MASS INDEX: 29.03 KG/M2

## 2021-07-21 DIAGNOSIS — J01.00 ACUTE MAXILLARY SINUSITIS, RECURRENCE NOT SPECIFIED: Primary | ICD-10-CM

## 2021-07-21 LAB
B-HCG UR QL: NEGATIVE
CTP QC/QA: YES

## 2021-07-21 PROCEDURE — 81025 URINE PREGNANCY TEST: CPT | Mod: S$GLB,,, | Performed by: EMERGENCY MEDICINE

## 2021-07-21 PROCEDURE — 81025 POCT URINE PREGNANCY: ICD-10-PCS | Mod: S$GLB,,, | Performed by: EMERGENCY MEDICINE

## 2021-07-21 PROCEDURE — 99214 PR OFFICE/OUTPT VISIT, EST, LEVL IV, 30-39 MIN: ICD-10-PCS | Mod: 25,S$GLB,, | Performed by: EMERGENCY MEDICINE

## 2021-07-21 PROCEDURE — 99214 OFFICE O/P EST MOD 30 MIN: CPT | Mod: 25,S$GLB,, | Performed by: EMERGENCY MEDICINE

## 2021-07-21 RX ORDER — AMOXICILLIN AND CLAVULANATE POTASSIUM 875; 125 MG/1; MG/1
1 TABLET, FILM COATED ORAL 2 TIMES DAILY
Qty: 20 TABLET | Refills: 0 | Status: SHIPPED | OUTPATIENT
Start: 2021-07-21 | End: 2021-07-31

## 2021-07-21 RX ORDER — DEXAMETHASONE SODIUM PHOSPHATE 4 MG/ML
8 INJECTION, SOLUTION INTRA-ARTICULAR; INTRALESIONAL; INTRAMUSCULAR; INTRAVENOUS; SOFT TISSUE ONCE
Status: COMPLETED | OUTPATIENT
Start: 2021-07-21 | End: 2021-07-21

## 2021-07-21 RX ORDER — CETIRIZINE HYDROCHLORIDE 10 MG/1
10 TABLET ORAL DAILY
Qty: 30 TABLET | Refills: 0 | Status: SHIPPED | OUTPATIENT
Start: 2021-07-21 | End: 2022-07-21

## 2021-07-21 RX ORDER — BENZONATATE 100 MG/1
200 CAPSULE ORAL 3 TIMES DAILY PRN
Qty: 30 CAPSULE | Refills: 1 | Status: SHIPPED | OUTPATIENT
Start: 2021-07-21 | End: 2021-08-20

## 2021-07-21 RX ADMIN — DEXAMETHASONE SODIUM PHOSPHATE 8 MG: 4 INJECTION, SOLUTION INTRA-ARTICULAR; INTRALESIONAL; INTRAMUSCULAR; INTRAVENOUS; SOFT TISSUE at 11:07

## 2021-10-06 ENCOUNTER — OFFICE VISIT (OUTPATIENT)
Dept: URGENT CARE | Facility: CLINIC | Age: 21
End: 2021-10-06
Payer: MEDICAID

## 2021-10-06 VITALS
WEIGHT: 188 LBS | HEART RATE: 77 BPM | OXYGEN SATURATION: 98 % | DIASTOLIC BLOOD PRESSURE: 85 MMHG | RESPIRATION RATE: 16 BRPM | TEMPERATURE: 100 F | BODY MASS INDEX: 29.51 KG/M2 | HEIGHT: 67 IN | SYSTOLIC BLOOD PRESSURE: 113 MMHG

## 2021-10-06 DIAGNOSIS — R09.81 HEAD CONGESTION: Primary | ICD-10-CM

## 2021-10-06 DIAGNOSIS — Z20.822 COVID-19 VIRUS NOT DETECTED: ICD-10-CM

## 2021-10-06 DIAGNOSIS — J32.9 SINUSITIS, UNSPECIFIED CHRONICITY, UNSPECIFIED LOCATION: ICD-10-CM

## 2021-10-06 LAB
CTP QC/QA: YES
SARS-COV-2 RDRP RESP QL NAA+PROBE: NEGATIVE

## 2021-10-06 PROCEDURE — 87635 SARS-COV-2 COVID-19 AMP PRB: CPT | Mod: QW,S$GLB,, | Performed by: NURSE PRACTITIONER

## 2021-10-06 PROCEDURE — 99214 PR OFFICE/OUTPT VISIT, EST, LEVL IV, 30-39 MIN: ICD-10-PCS | Mod: S$GLB,,, | Performed by: NURSE PRACTITIONER

## 2021-10-06 PROCEDURE — 87635 PR SARS-COV-2 COVID-19 AMPLIFIED PROBE: ICD-10-PCS | Mod: QW,S$GLB,, | Performed by: NURSE PRACTITIONER

## 2021-10-06 PROCEDURE — 99214 OFFICE O/P EST MOD 30 MIN: CPT | Mod: S$GLB,,, | Performed by: NURSE PRACTITIONER

## 2021-10-06 RX ORDER — FLUTICASONE PROPIONATE 50 MCG
1 SPRAY, SUSPENSION (ML) NASAL DAILY
Qty: 15.8 ML | Refills: 0 | Status: SHIPPED | OUTPATIENT
Start: 2021-10-06

## 2021-10-06 RX ORDER — AMOXICILLIN AND CLAVULANATE POTASSIUM 875; 125 MG/1; MG/1
1 TABLET, FILM COATED ORAL EVERY 12 HOURS
Qty: 20 TABLET | Refills: 0 | Status: SHIPPED | OUTPATIENT
Start: 2021-10-06 | End: 2021-10-16

## 2021-10-06 RX ORDER — CETIRIZINE HYDROCHLORIDE 10 MG/1
10 TABLET ORAL DAILY
Qty: 30 TABLET | Refills: 0 | Status: SHIPPED | OUTPATIENT
Start: 2021-10-06 | End: 2021-11-05

## 2022-03-06 ENCOUNTER — HOSPITAL ENCOUNTER (EMERGENCY)
Facility: HOSPITAL | Age: 22
Discharge: HOME | End: 2022-03-06
Attending: EMERGENCY MEDICINE
Payer: MEDICAID

## 2022-03-06 VITALS
SYSTOLIC BLOOD PRESSURE: 110 MMHG | BODY MASS INDEX: 29.03 KG/M2 | HEART RATE: 79 BPM | OXYGEN SATURATION: 98 % | TEMPERATURE: 98 F | HEIGHT: 67 IN | DIASTOLIC BLOOD PRESSURE: 70 MMHG | WEIGHT: 185 LBS | RESPIRATION RATE: 16 BRPM

## 2022-03-06 DIAGNOSIS — N12 PYELONEPHRITIS: Primary | ICD-10-CM

## 2022-03-06 LAB
ALBUMIN SERPL BCP-MCNC: 4.3 G/DL (ref 3.5–5.2)
ALP SERPL-CCNC: 69 U/L (ref 55–135)
ALT SERPL W/O P-5'-P-CCNC: 14 U/L (ref 10–44)
ANION GAP SERPL CALC-SCNC: 7 MMOL/L (ref 8–16)
AST SERPL-CCNC: 16 U/L (ref 10–40)
B-HCG UR QL: NEGATIVE
BACTERIA #/AREA URNS HPF: ABNORMAL /HPF
BASOPHILS # BLD AUTO: 0.03 K/UL (ref 0–0.2)
BASOPHILS NFR BLD: 0.3 % (ref 0–1.9)
BILIRUB SERPL-MCNC: 1.7 MG/DL (ref 0.1–1)
BILIRUB UR QL STRIP: ABNORMAL
BUN SERPL-MCNC: 8 MG/DL (ref 6–20)
CALCIUM SERPL-MCNC: 9 MG/DL (ref 8.7–10.5)
CHLORIDE SERPL-SCNC: 107 MMOL/L (ref 95–110)
CLARITY UR: ABNORMAL
CO2 SERPL-SCNC: 25 MMOL/L (ref 23–29)
COLOR UR: YELLOW
CREAT SERPL-MCNC: 0.6 MG/DL (ref 0.5–1.4)
CTP QC/QA: YES
DIFFERENTIAL METHOD: NORMAL
EOSINOPHIL # BLD AUTO: 0.2 K/UL (ref 0–0.5)
EOSINOPHIL NFR BLD: 2.3 % (ref 0–8)
ERYTHROCYTE [DISTWIDTH] IN BLOOD BY AUTOMATED COUNT: 13.2 % (ref 11.5–14.5)
EST. GFR  (AFRICAN AMERICAN): >60 ML/MIN/1.73 M^2
EST. GFR  (NON AFRICAN AMERICAN): >60 ML/MIN/1.73 M^2
GLUCOSE SERPL-MCNC: 107 MG/DL (ref 70–110)
GLUCOSE UR QL STRIP: ABNORMAL
HCT VFR BLD AUTO: 40 % (ref 37–48.5)
HGB BLD-MCNC: 13.3 G/DL (ref 12–16)
HGB UR QL STRIP: ABNORMAL
HYALINE CASTS #/AREA URNS LPF: 0 /LPF
IMM GRANULOCYTES # BLD AUTO: 0.02 K/UL (ref 0–0.04)
IMM GRANULOCYTES NFR BLD AUTO: 0.2 % (ref 0–0.5)
KETONES UR QL STRIP: NEGATIVE
LEUKOCYTE ESTERASE UR QL STRIP: ABNORMAL
LIPASE SERPL-CCNC: 30 U/L (ref 4–60)
LYMPHOCYTES # BLD AUTO: 2.4 K/UL (ref 1–4.8)
LYMPHOCYTES NFR BLD: 28.3 % (ref 18–48)
MCH RBC QN AUTO: 27.4 PG (ref 27–31)
MCHC RBC AUTO-ENTMCNC: 33.3 G/DL (ref 32–36)
MCV RBC AUTO: 83 FL (ref 82–98)
MICROSCOPIC COMMENT: ABNORMAL
MONOCYTES # BLD AUTO: 0.8 K/UL (ref 0.3–1)
MONOCYTES NFR BLD: 9.6 % (ref 4–15)
NEUTROPHILS # BLD AUTO: 5.1 K/UL (ref 1.8–7.7)
NEUTROPHILS NFR BLD: 59.3 % (ref 38–73)
NITRITE UR QL STRIP: NEGATIVE
NRBC BLD-RTO: 0 /100 WBC
PH UR STRIP: 7 [PH] (ref 5–8)
PLATELET # BLD AUTO: 318 K/UL (ref 150–450)
PMV BLD AUTO: 10.1 FL (ref 9.2–12.9)
POTASSIUM SERPL-SCNC: 3.2 MMOL/L (ref 3.5–5.1)
PROT SERPL-MCNC: 6.8 G/DL (ref 6–8.4)
PROT UR QL STRIP: ABNORMAL
RBC # BLD AUTO: 4.85 M/UL (ref 4–5.4)
RBC #/AREA URNS HPF: 5 /HPF (ref 0–4)
SODIUM SERPL-SCNC: 139 MMOL/L (ref 136–145)
SP GR UR STRIP: 1.02 (ref 1–1.03)
SQUAMOUS #/AREA URNS HPF: 25 /HPF
URN SPEC COLLECT METH UR: ABNORMAL
UROBILINOGEN UR STRIP-ACNC: >=8 EU/DL
WBC # BLD AUTO: 8.63 K/UL (ref 3.9–12.7)
WBC #/AREA URNS HPF: 20 /HPF (ref 0–5)

## 2022-03-06 PROCEDURE — 81025 URINE PREGNANCY TEST: CPT | Performed by: PHYSICIAN ASSISTANT

## 2022-03-06 PROCEDURE — 99284 EMERGENCY DEPT VISIT MOD MDM: CPT | Mod: 25

## 2022-03-06 PROCEDURE — 87086 URINE CULTURE/COLONY COUNT: CPT | Performed by: PHYSICIAN ASSISTANT

## 2022-03-06 PROCEDURE — 80053 COMPREHEN METABOLIC PANEL: CPT | Performed by: PHYSICIAN ASSISTANT

## 2022-03-06 PROCEDURE — 25000003 PHARM REV CODE 250: Performed by: PHYSICIAN ASSISTANT

## 2022-03-06 PROCEDURE — 85025 COMPLETE CBC W/AUTO DIFF WBC: CPT | Performed by: PHYSICIAN ASSISTANT

## 2022-03-06 PROCEDURE — 96365 THER/PROPH/DIAG IV INF INIT: CPT

## 2022-03-06 PROCEDURE — 83690 ASSAY OF LIPASE: CPT | Performed by: PHYSICIAN ASSISTANT

## 2022-03-06 PROCEDURE — 63600175 PHARM REV CODE 636 W HCPCS: Performed by: PHYSICIAN ASSISTANT

## 2022-03-06 PROCEDURE — 81001 URINALYSIS AUTO W/SCOPE: CPT | Performed by: PHYSICIAN ASSISTANT

## 2022-03-06 PROCEDURE — 96361 HYDRATE IV INFUSION ADD-ON: CPT

## 2022-03-06 PROCEDURE — 87186 SC STD MICRODIL/AGAR DIL: CPT | Performed by: PHYSICIAN ASSISTANT

## 2022-03-06 PROCEDURE — 87077 CULTURE AEROBIC IDENTIFY: CPT | Performed by: PHYSICIAN ASSISTANT

## 2022-03-06 RX ORDER — IBUPROFEN 800 MG/1
800 TABLET ORAL EVERY 6 HOURS PRN
Qty: 20 TABLET | Refills: 0 | Status: SHIPPED | OUTPATIENT
Start: 2022-03-06

## 2022-03-06 RX ORDER — ONDANSETRON 4 MG/1
4 TABLET, ORALLY DISINTEGRATING ORAL EVERY 6 HOURS PRN
Qty: 12 TABLET | Refills: 0 | Status: SHIPPED | OUTPATIENT
Start: 2022-03-06

## 2022-03-06 RX ORDER — MEDROXYPROGESTERONE ACETATE 150 MG/ML
150 INJECTION, SUSPENSION INTRAMUSCULAR
COMMUNITY

## 2022-03-06 RX ORDER — KETOROLAC TROMETHAMINE 30 MG/ML
15 INJECTION, SOLUTION INTRAMUSCULAR; INTRAVENOUS
Status: COMPLETED | OUTPATIENT
Start: 2022-03-06 | End: 2022-03-06

## 2022-03-06 RX ORDER — CEPHALEXIN 500 MG/1
500 CAPSULE ORAL EVERY 8 HOURS
Qty: 30 CAPSULE | Refills: 0 | Status: SHIPPED | OUTPATIENT
Start: 2022-03-06 | End: 2022-03-16

## 2022-03-06 RX ADMIN — KETOROLAC TROMETHAMINE 15 MG: 30 INJECTION, SOLUTION INTRAMUSCULAR at 06:03

## 2022-03-06 RX ADMIN — CEFTRIAXONE 1 G: 1 INJECTION, SOLUTION INTRAVENOUS at 07:03

## 2022-03-06 RX ADMIN — POTASSIUM BICARBONATE 40 MEQ: 391 TABLET, EFFERVESCENT ORAL at 07:03

## 2022-03-06 RX ADMIN — SODIUM CHLORIDE 1000 ML: 0.9 INJECTION, SOLUTION INTRAVENOUS at 06:03

## 2022-03-07 NOTE — ED NOTES
Pt presents to ED c/o R-sided flank pain rated 8/10 x1 day. Pt's vital signs are stable, NAD, and is AOx4.

## 2022-03-07 NOTE — DISCHARGE INSTRUCTIONS
Take medication as prescribed.  Return to the ER if your symptoms worsen or if you develop fever and are unable to keep down your antibiotics

## 2022-03-07 NOTE — ED PROVIDER NOTES
Encounter Date: 3/6/2022       History     Chief Complaint   Patient presents with    Flank Pain     RT X 1 DAY     21-year-old female presents ER for evaluation of right flank pain.  Patient reports symptoms started yesterday.  Denies any associated hematuria, dysuria.  She has not had any fevers or chills at home.  Denies prior history of kidney stones but has had kidney infections in the past.  She did not take any medicine for her symptoms.  No recent antibiotic use.  No prior abdominal surgeries.  Denies any cough congestion or URI symptoms.  She has not had any vaginal discharge or bleeding.  No concerns for STDs.    The history is provided by the patient.     Review of patient's allergies indicates:  No Known Allergies  Past Medical History:   Diagnosis Date    Headache     Postpartum depression     concerned due to history      Past Surgical History:   Procedure Laterality Date    KNEE ARTHROSCOPY W/ ACL RECONSTRUCTION      KNEE ARTHROSCOPY W/ MENISCAL REPAIR      KNEE ARTHROSCOPY W/ PCL AND LCL  REPAIR & TENDON GRAFT      TONSILLECTOMY       Family History   Problem Relation Age of Onset    No Known Problems Mother     No Known Problems Father     No Known Problems Sister     No Known Problems Brother     No Known Problems Daughter     No Known Problems Son     No Known Problems Maternal Aunt     No Known Problems Maternal Uncle     No Known Problems Paternal Aunt     No Known Problems Paternal Uncle     No Known Problems Maternal Grandmother     No Known Problems Maternal Grandfather     No Known Problems Paternal Grandmother     No Known Problems Paternal Grandfather      Social History     Tobacco Use    Smoking status: Former Smoker     Types: Cigarettes     Quit date: 8/15/2020     Years since quittin.5    Smokeless tobacco: Never Used    Tobacco comment: Quit when she found out she was pregnant.   Substance Use Topics    Alcohol use: Not Currently    Drug use: Not  Currently     Review of Systems   Constitutional: Negative for chills and fever.   HENT: Negative for congestion.    Eyes: Negative for visual disturbance.   Respiratory: Negative for shortness of breath.    Cardiovascular: Negative for chest pain.   Gastrointestinal: Negative for nausea and vomiting.   Genitourinary: Positive for flank pain. Negative for dysuria.   Musculoskeletal: Negative for myalgias.   Skin: Negative for rash.   Allergic/Immunologic: Negative for immunocompromised state.   Neurological: Negative for weakness and numbness.   Hematological: Does not bruise/bleed easily.   Psychiatric/Behavioral: Negative for confusion.       Physical Exam     Initial Vitals [03/06/22 1703]   BP Pulse Resp Temp SpO2   (!) 123/92 90 18 98.4 °F (36.9 °C) 100 %      MAP       --         Physical Exam    Vitals reviewed.  Constitutional: She appears well-developed and well-nourished. She is not diaphoretic. No distress.   HENT:   Head: Normocephalic and atraumatic.   Eyes: Conjunctivae and EOM are normal.   Neck: Neck supple.   Cardiovascular: Normal rate, regular rhythm, normal heart sounds and intact distal pulses.   Pulmonary/Chest: Breath sounds normal. No respiratory distress.   Abdominal: Abdomen is soft. There is abdominal tenderness (right flank).   There is right CVA tenderness.  No left CVA tenderness. There is no rebound and no guarding.   Musculoskeletal:         General: Normal range of motion.      Cervical back: Neck supple.     Neurological: She is alert and oriented to person, place, and time. She has normal strength.   Skin: Skin is warm.         ED Course   Procedures  Labs Reviewed   COMPREHENSIVE METABOLIC PANEL - Abnormal; Notable for the following components:       Result Value    Potassium 3.2 (*)     Total Bilirubin 1.7 (*)     Anion Gap 7 (*)     All other components within normal limits   URINALYSIS, REFLEX TO URINE CULTURE - Abnormal; Notable for the following components:    Appearance, UA  Hazy (*)     Protein, UA 2+ (*)     Glucose, UA 1+ (*)     Bilirubin (UA) 1+ (*)     Occult Blood UA 3+ (*)     Urobilinogen, UA >=8.0 (*)     Leukocytes, UA 1+ (*)     All other components within normal limits    Narrative:     Specimen Source->Urine   URINALYSIS MICROSCOPIC - Abnormal; Notable for the following components:    RBC, UA 5 (*)     WBC, UA 20 (*)     Bacteria Few (*)     All other components within normal limits    Narrative:     Specimen Source->Urine   CULTURE, URINE   CBC W/ AUTO DIFFERENTIAL   LIPASE   POCT URINE PREGNANCY          Imaging Results    None          Medications   sodium chloride 0.9% bolus 1,000 mL (1,000 mLs Intravenous New Bag 3/6/22 1821)   cefTRIAXone (ROCEPHIN) 1 g/50 mL D5W IVPB (has no administration in time range)   potassium bicarbonate disintegrating tablet 40 mEq (has no administration in time range)   ketorolac injection 15 mg (15 mg Intravenous Given 3/6/22 1821)           APC / Resident Notes:   Patient in the ER promptly upon arrival.  She is afebrile, no acute distress.  Physical examination reveals tenderness on palpation to the right flank.  Positive right CVA tenderness.  Abdomen is otherwise soft, nondistended.  IV access established, labs ordered.    Pregnancy test is negative.    Laboratory studies show normal white count of 8.6.  Hemoglobin stable.  Chemistries were found to be unremarkable.  Normal liver and kidney functions.  Potassium of 3.2.  Patient was given supplemental potassium in the ED.  Urinalysis concerning for UTI.      Given patient's flank pain and urinalysis will treat as pyelonephritis.  She was given a g of Rocephin in the ED and will prescribed home on Keflex.  Will also prescribed Zofran and ibuprofen use as directed.  She was advised to finish full course of antibiotics.  Patient instructed to stay hydrated at home.  Given strict return precautions the ED including but not limited to worsening pain, development of fever or inability to  keep down antibiotics.  Stable For discharge and close follow-up    Disclaimer: This note has been generated using voice-recognition software. There may be typographical errors that have been missed during proof-reading.         Attending Attestation:     Physician Attestation Statement for NP/PA:       Other NP/PA Attestation Additions:    History of Present Illness: I was not called upon to see this patient but was available for consultation                        Clinical Impression:   Final diagnoses:  [N12] Pyelonephritis (Primary)          ED Disposition Condition    Discharge Stable        ED Prescriptions     Medication Sig Dispense Start Date End Date Auth. Provider    ondansetron (ZOFRAN-ODT) 4 MG TbDL Take 1 tablet (4 mg total) by mouth every 6 (six) hours as needed. 12 tablet 3/6/2022  Darcie Isabel PA-C    ibuprofen (ADVIL,MOTRIN) 800 MG tablet Take 1 tablet (800 mg total) by mouth every 6 (six) hours as needed for Pain. 20 tablet 3/6/2022  Darcie Isabel PA-C    cephALEXin (KEFLEX) 500 MG capsule Take 1 capsule (500 mg total) by mouth every 8 (eight) hours. for 10 days 30 capsule 3/6/2022 3/16/2022 Darcie Isabel PA-C        Follow-up Information    None          Darcie Isabel PA-C  03/06/22 1906       Ko Man MD  03/06/22 0409

## 2022-03-08 LAB — BACTERIA UR CULT: ABNORMAL

## 2022-12-28 ENCOUNTER — HOSPITAL ENCOUNTER (EMERGENCY)
Facility: HOSPITAL | Age: 22
Discharge: HOME | End: 2022-12-28
Attending: EMERGENCY MEDICINE
Payer: MEDICAID

## 2022-12-28 VITALS
TEMPERATURE: 99 F | HEART RATE: 82 BPM | OXYGEN SATURATION: 100 % | DIASTOLIC BLOOD PRESSURE: 80 MMHG | WEIGHT: 160 LBS | RESPIRATION RATE: 17 BRPM | SYSTOLIC BLOOD PRESSURE: 132 MMHG | BODY MASS INDEX: 25.11 KG/M2 | HEIGHT: 67 IN

## 2022-12-28 DIAGNOSIS — Z98.890 HISTORY OF REPAIR OF ACL: ICD-10-CM

## 2022-12-28 DIAGNOSIS — S89.91XA INJURY OF RIGHT KNEE, INITIAL ENCOUNTER: Primary | ICD-10-CM

## 2022-12-28 LAB
B-HCG UR QL: NEGATIVE
HCV AB SERPL QL IA: NORMAL
HIV 1+2 AB+HIV1 P24 AG SERPL QL IA: NORMAL

## 2022-12-28 PROCEDURE — 29505 APPLICATION LONG LEG SPLINT: CPT | Mod: RT

## 2022-12-28 PROCEDURE — 36415 COLL VENOUS BLD VENIPUNCTURE: CPT | Performed by: EMERGENCY MEDICINE

## 2022-12-28 PROCEDURE — 96372 THER/PROPH/DIAG INJ SC/IM: CPT | Performed by: PHYSICIAN ASSISTANT

## 2022-12-28 PROCEDURE — 99284 EMERGENCY DEPT VISIT MOD MDM: CPT | Mod: 25

## 2022-12-28 PROCEDURE — 87389 HIV-1 AG W/HIV-1&-2 AB AG IA: CPT | Performed by: EMERGENCY MEDICINE

## 2022-12-28 PROCEDURE — 81025 URINE PREGNANCY TEST: CPT | Performed by: PHYSICIAN ASSISTANT

## 2022-12-28 PROCEDURE — 86803 HEPATITIS C AB TEST: CPT | Performed by: EMERGENCY MEDICINE

## 2022-12-28 PROCEDURE — 63600175 PHARM REV CODE 636 W HCPCS: Performed by: PHYSICIAN ASSISTANT

## 2022-12-28 RX ORDER — KETOROLAC TROMETHAMINE 30 MG/ML
30 INJECTION, SOLUTION INTRAMUSCULAR; INTRAVENOUS
Status: COMPLETED | OUTPATIENT
Start: 2022-12-28 | End: 2022-12-28

## 2022-12-28 RX ORDER — DICLOFENAC SODIUM 50 MG/1
50 TABLET, DELAYED RELEASE ORAL 2 TIMES DAILY PRN
Qty: 20 TABLET | Refills: 0 | Status: SHIPPED | OUTPATIENT
Start: 2022-12-28

## 2022-12-28 RX ADMIN — KETOROLAC TROMETHAMINE 30 MG: 30 INJECTION, SOLUTION INTRAMUSCULAR; INTRAVENOUS at 05:12

## 2022-12-28 NOTE — ED PROVIDER NOTES
"Encounter Date: 12/28/2022    SCRIBE #1 NOTE: I, Wu Alvarez, am scribing for, and in the presence of,  Jsei Walters PA-C.     History     Chief Complaint   Patient presents with    Knee Pain     Patient has right knee pain, stated a card buckled under her and she is having pain and swelling states she had a previous surgery on this knee 10 years ago and is concerned because she heard a pop when the chair collapsed      Time seen by provider: 4:35 PM on 12/28/2022    Khloe Almeida is a 22 y.o. female with no significant PMHx who presents to the ED with an onset of right knee pain since December 25. The patient states that 3 days ago she sat in a chair and the legs broke causing her to fall. She reports that when she fell she heard a "pop" in her right knee. At first she thought she was fine but the pain gradually worsened. 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 She now c/o her right knee constantly being painful and going numb. Usually she uses Tylenol for pain management but has not had any today. The patient denies any other symptoms at this time. PSHx of ACL reconstruction, meniscal repair, and ACL & PCL repair and tendon graft. The patient expresses concerns about this knee injury due to her series of knee surgeries almost ten years ago.      The history is provided by the patient.   Review of patient's allergies indicates:  No Known Allergies  Past Medical History:   Diagnosis Date    Headache     Postpartum depression     concerned due to history      Past Surgical History:   Procedure Laterality Date    KNEE ARTHROSCOPY W/ ACL RECONSTRUCTION      KNEE ARTHROSCOPY W/ MENISCAL REPAIR      KNEE ARTHROSCOPY W/ PCL AND LCL  REPAIR & TENDON GRAFT      TONSILLECTOMY       Family History   Problem Relation Age of Onset    No Known Problems Mother     No Known Problems Father     No Known Problems Sister     No Known Problems Brother     No Known Problems Daughter     No " Known Problems Son     No Known Problems Maternal Aunt     No Known Problems Maternal Uncle     No Known Problems Paternal Aunt     No Known Problems Paternal Uncle     No Known Problems Maternal Grandmother     No Known Problems Maternal Grandfather     No Known Problems Paternal Grandmother     No Known Problems Paternal Grandfather      Social History     Tobacco Use    Smoking status: Former     Types: Cigarettes     Quit date: 8/15/2020     Years since quittin.3    Smokeless tobacco: Never    Tobacco comments:     Quit when she found out she was pregnant.   Substance Use Topics    Alcohol use: Not Currently    Drug use: Not Currently     Review of Systems   Constitutional:  Negative for chills and fever.   Musculoskeletal:  Positive for arthralgias, joint swelling and myalgias. Negative for back pain, neck pain and neck stiffness.   Skin:  Negative for color change, pallor, rash and wound.   Neurological:  Negative for weakness and numbness.   Hematological:  Does not bruise/bleed easily.     Physical Exam     Initial Vitals [22 1510]   BP Pulse Resp Temp SpO2   132/80 82 17 98.8 °F (37.1 °C) 100 %      MAP       --         Physical Exam    Nursing note and vitals reviewed.  Constitutional: She appears well-developed and well-nourished. She is not diaphoretic. No distress.   HENT:   Head: Normocephalic and atraumatic.   Cardiovascular:  Intact distal pulses.           Musculoskeletal:         General: Tenderness present. Normal range of motion.      Comments: Mild TTP noted to anterolateral right knee without effusion, erythema or warmth.  Well-healed surgical incisions noted.  Increased pain with flexion and extension of the knee.  Palpable 2+ pedal pulse.      Neurological: She is alert and oriented to person, place, and time. She has normal strength. No sensory deficit.   Skin: Skin is warm and dry. No rash and no abscess noted. No erythema.   Psychiatric: She has a normal mood and affect.       ED  Course   Procedures  Labs Reviewed   PREGNANCY TEST, URINE RAPID    Narrative:     Specimen Source->Urine   HIV 1 / 2 ANTIBODY   HEPATITIS C ANTIBODY          Imaging Results              X-Ray Knee 3 View Right (Final result)  Result time 12/28/22 15:54:34   Procedure changed from X-Ray Knee 1 or 2 View Right     Final result by Cristhian Garcia Jr., MD (12/28/22 15:54:34)                   Impression:      Prior anterior cruciate ligament repair otherwise negative right knee x-rays      Electronically signed by: Cristhian Garcia MD  Date:    12/28/2022  Time:    15:54               Narrative:    EXAMINATION:  XR KNEE 3 VIEW RIGHT    CLINICAL HISTORY:  knee pain;    TECHNIQUE:  AP, lateral, and Merchant views of the right knee were performed.    COMPARISON:  Right knee x-ray of January 4, 2016    FINDINGS:  The patient has undergone anterior cruciate ligament repair with a button noted on the right femoral lateral epicondyle and a ligament retention device seen in the tunnel within the tibia.  No fractures are identified.  The intercondylar joint spaces within normal limits.  No joint effusion is identified.                                       Medications   ketorolac injection 30 mg (30 mg Intramuscular Given 12/28/22 7840)     Medical Decision Making:   History:   Old Medical Records: I decided to obtain old medical records.  Old Records Summarized: records from clinic visits and records from previous admission(s).  Differential Diagnosis:   Fracture  Dislocation  Sprain  Contusion  Strain  Spasm  Ligamentous injury   Meniscal Tear   Clinical Tests:   Lab Tests: Ordered and Reviewed  Radiological Study: Ordered and Reviewed  ED Management:  X-rays of the right knee show no acute bony abnormalities, fractures or dislocations; however, given her previous ACL repair and new injury, will place in a knee immobilizer and refer to orthopedics for re-evaluation and further treatment options.  She voices understanding  and is agreeable to the plan.  She is given specific return precautions.            Scribe Attestation:   Scribe #1: I performed the above scribed service and the documentation accurately describes the services I performed. I attest to the accuracy of the note.            I, Jesi Walters PA-C, personally performed the services described in this documentation. All medical record entries made by the scribe were at my direction and in my presence.  I have reviewed the chart and agree that the record reflects my personal performance and is accurate and complete. Jesi Walters PA-C.  8:34 PM 12/28/2022         Clinical Impression:   Final diagnoses:  [S89.91XA] Injury of right knee, initial encounter (Primary)  [Z98.890] History of repair of ACL        ED Disposition Condition    Discharge Stable          ED Prescriptions       Medication Sig Dispense Start Date End Date Auth. Provider    diclofenac (VOLTAREN) 50 MG EC tablet Take 1 tablet (50 mg total) by mouth 2 (two) times daily as needed (pain). 20 tablet 12/28/2022 -- Jesi Walters PA-C          Follow-up Information       Follow up With Specialties Details Why Contact Info    [facility] Emergency Dept Emergency Medicine  As needed, If symptoms worsen [address]  [facility] [id number] [phone]    Mateusz Renteria MD Orthopedic Surgery  for orthopedic surgery evaluation [address]  [address]  [facility]  [facility] [address]  [phone]      Akbar Michael MD Sports Medicine, Orthopedic Surgery   [address]  [address]  [place] [address]  [phone]      Darrel Lancaster MD Orthopedic Surgery, Surgery, Sports Medicine  for orthopedic surgery evaluation [address]  [address]  [place] [address]  [phone]               Jesi Walters PA-C  12/28/22 2034

## 2022-12-28 NOTE — Clinical Note
"Khloe "China Almeida was seen and treated in our emergency department on 12/28/2022.  She may return to work on 12/30/2022.       If you have any questions or concerns, please don't hesitate to call.      Jesi Walters PA-C"

## 2022-12-28 NOTE — FIRST PROVIDER EVALUATION
Emergency Department TeleTriage Encounter Note      CHIEF COMPLAINT    Chief Complaint   Patient presents with    Knee Pain     Patient has right knee pain, stated a card buckled under her and she is having pain and swelling states she had a previous surgery on this knee 10 years ago and is concerned because she heard a pop when the chair collapsed        VITAL SIGNS   Initial Vitals [12/28/22 1510]   BP Pulse Resp Temp SpO2   132/80 82 17 98.8 °F (37.1 °C) 100 %      MAP       --            ALLERGIES    Review of patient's allergies indicates:  No Known Allergies    PROVIDER TRIAGE NOTE  This is a teletriage evaluation of a 22 y.o. female presenting to the ED complaining of knee pain. Patient reports fall off of chair 3 days ago. She has had pain to the right knee since then. She took ibuprofen without relief.      Initial orders will be placed and care will be transferred to an alternate provider when patient is roomed for a full evaluation. Any additional orders and the final disposition will be determined by that provider.         ORDERS  Labs Reviewed   HIV 1 / 2 ANTIBODY   HEPATITIS C ANTIBODY   POCT URINE PREGNANCY       ED Orders (720h ago, onward)      Start Ordered     Status Ordering Provider    12/28/22 1528 12/28/22 1529  X-Ray Knee 1 or 2 View Right  1 time imaging         Ordered PRINCE DELCID    12/28/22 1528 12/28/22 1529  POCT urine pregnancy  Once         Ordered PRINCE DELCID    12/28/22 1513 12/28/22 1512  HIV 1/2 Ag/Ab (4th Gen)  STAT         Pending Collection LESLIE ARNDT    12/28/22 1513 12/28/22 1512  Hepatitis C Antibody  STAT         Pending Collection LESLIE ARNDT              Virtual Visit Note: The provider triage portion of this emergency department evaluation and documentation was performed via [employer], a HIPAA-compliant telemedicine application, in concert with a tele-presenter in the room. A face to face patient evaluation with one of my colleagues will occur  once the patient is placed in an emergency department room.      DISCLAIMER: This note was prepared with [employer] voice recognition transcription software. Garbled syntax, mangled pronouns, and other bizarre constructions may be attributed to that software system.

## 2022-12-29 ENCOUNTER — TELEPHONE (OUTPATIENT)
Dept: ORTHOPEDICS | Facility: CLINIC | Age: 22
End: 2022-12-29
Payer: MEDICAID

## 2023-02-27 DIAGNOSIS — S89.91XA INJURY OF RIGHT KNEE, INITIAL ENCOUNTER: Primary | ICD-10-CM

## 2023-03-22 PROBLEM — S80.911A UNSPECIFIED SUPERFICIAL INJURY OF RIGHT KNEE, INITIAL ENCOUNTER: Status: ACTIVE | Noted: 2023-03-22

## 2023-03-23 ENCOUNTER — CLINICAL SUPPORT (OUTPATIENT)
Dept: REHABILITATION | Facility: HOSPITAL | Age: 23
End: 2023-03-23
Payer: MEDICAID

## 2023-03-23 DIAGNOSIS — S80.911A UNSPECIFIED SUPERFICIAL INJURY OF RIGHT KNEE, INITIAL ENCOUNTER: ICD-10-CM

## 2023-03-23 DIAGNOSIS — R29.898 DECREASED STRENGTH INVOLVING KNEE JOINT: ICD-10-CM

## 2023-03-23 DIAGNOSIS — R26.9 GAIT ABNORMALITY: ICD-10-CM

## 2023-03-23 DIAGNOSIS — M25.661 DECREASED RANGE OF MOTION OF RIGHT KNEE: ICD-10-CM

## 2023-03-23 PROCEDURE — 97110 THERAPEUTIC EXERCISES: CPT | Mod: PN | Performed by: PHYSICAL THERAPIST

## 2023-03-23 PROCEDURE — 97161 PT EVAL LOW COMPLEX 20 MIN: CPT | Mod: PN | Performed by: PHYSICAL THERAPIST

## 2023-03-24 NOTE — PLAN OF CARE
"[facility]   Physical Therapy Initial Evaluation     Date: 3/23/2023   Name: Khloe BAIN [facility] Number: [id number]    Therapy Diagnosis:   Encounter Diagnoses   Name Primary?    Unspecified superficial injury of right knee, initial encounter     Decreased strength involving knee joint     Decreased range of motion of right knee     Gait abnormality      Physician: Reno Jones MD    Physician Orders: PT Eval and Treat   Medical Diagnosis from Referral: Injury of right knee, initial encounter  Evaluation Date: 3/23/2023  Authorization Period Expiration: 2/27/2024  Plan of Care Expiration: 5/5/2023  Visit # / Visits authorized: 1/ 1   (POC 1/12)   FOTO Due visit 5  FOTO Due visit 10    Precautions: Standard  Insurance: Payor: MEDICAID / Plan: [place] [place]UE ([employer] [place] / Product Type: Managed Medicaid /     Time In: 1600  Time Out: 1700  Total Appointment Time (timed & untimed codes): 60 minutes      SUBJECTIVE   Date of onset: 12/25/2022    History of current condition - Khloe reports: she was sitting in a chair with her right lower extremity externally rotated and under her left lower extremity when the chair collapsed and she fell. She went to the ED on 2/28/2022 where X-rays were performed and were negative for fractures. She was discharged from the ED with a soft short leg knee brace and referred for an MRI, which will be performed on 3/31/2023. She states that approximately 8 years ago, she tore her right ACL, LCL and meniscus (not sure medial versus lateral) while playing soccer. She underwent and ACL, LCL and meniscal repair and attended physical therapy. She continued to be active in [employer] in high school and noted intermittent right knee pain. She presents to PT wearing the soft short leg knee brace with complaints of generalized right knee pain. She reports increased pain with prolonged standing and squatting.     Falls: 1    Imaging, X-rays: "Prior anterior " "cruciate ligament repair otherwise negative right knee x-rays"    Prior Therapy: PT  Social History:  lives with their family  Occupation: Works a [employer]  Prior Level of Function: Independent  Current Level of Function: Decreased ability to perform ADL and limited tolerance to standing and walking.    Pain:  Current 8/10, worst 10/10, best 0/10   Location: right knee    Description: Sharp  Aggravating Factors: Standing, Bending, and Squatting  Easing Factors: OTC pain medication    Patients goals: Decrease pain and return to previous level of activity     Medical History:   Past Medical History:   Diagnosis Date    Headache     Postpartum depression     concerned due to history        Surgical History:   Khloe Almeida  has a past surgical history that includes Tonsillectomy; Knee arthroscopy w/ ACL reconstruction; Knee arthroscopy w/ meniscal repair; and Knee arthroscopy w/ PCL & LCL repair & tendon graft.    Medications:   Khloe has a current medication list which includes the following prescription(s): benzocaine-lanolin, butalbital-acetaminophen-caffeine -40 mg, cetirizine, diclofenac, estradiol cypionate, fluticasone propionate, ibuprofen, lanolin, medroxyprogesterone, and ondansetron.    Allergies:   Review of patient's allergies indicates:  No Known Allergies       OBJECTIVE     Posture: Decreased lumbar lordosis and forward head in standing  Palpation: Severe point tenderness noted with palpation of the lateral border of the right patella and along the medial joint line   Sensation: Intact    Range of Motion/Strength:     Knee ROM Left Right Pain/Dysfunction with Movement   Knee flexion 0 degrees 0 degress    Knee extension 130 degrees 112 degrees      Knee MMT Left Right   Knee flexion 5/5 4/5   Knee extension 5/5 4/5       Flexibility Left Right   Hamstrings 52 degrees 48 degrees   Achilles 0 degrees 0 degrees     Girth measurements Left Right   5 cm above MJL  41.5 cm  40.6 cm  "   At MJL  37.7 cm  37.5 cm    5 cm below MJL 35.4 cm  35.4 cm      Special Tests:  Left Right   Anterior drawer negative. negative.   Lachman negative but some laxity noted negative but some laxity noted   Valgus stress  negative. negative.   Varus stress negative. negative.   Hong negative. negative.   Lateral tracking negative. positive. J tracking     Gait Without AD   Analysis The patient ambulates with bilateral LE externally rotated in stance phase. Decreased knee flexion in swing phase       Limitation/Restriction for FOTO  Survey    Therapist reviewed FOTO scores for Khloe Almeida on 3/23/2023.   FOTO documents entered into EPIC - see Media section.    Limitation Score: 56%         TREATMENT     Total Treatment time (time-based codes) separate from Evaluation: 30 minutes      Khloe received the treatments listed below:      THERAPEUTIC EXERCISES to develop strength, ROM, and flexibility for 30 minutes including :     Long sitting Hamstring stretch 3 x 30 sec  Long sitting Gastroc-soleus stretch 3 x 30 sec  Quad sets 3 x 10  Straight leg raise with hip external rotation 3 x 10  Adductor squeeze 3 x 10  Hook lying hip abduction with Green theraband 3 x 10  Heels slides 3 x 10  Standing Terminal Knee Extension with ball 3 x 10    PATIENT EDUCATION AND HOME EXERCISES     Education provided:   - POC for lower extremity strengthening and return of ROM      Written Home Exercises Provided: yes. Exercises were reviewed and Khloe was able to demonstrate them prior to the end of the session.  Khloe demonstrated good  understanding of the education provided. See EMR under Patient Instructions for exercises provided during therapy sessions.    ASSESSMENT     Khloe is a 22 y.o. female referred to outpatient Physical Therapy with a medical diagnosis of Injury of right knee, initial encounter. Patient presents with :    Right knee pain  Decreased right knee ROM  Decreased right knee  strength  Decreased lower extremity flexibility  Decreased ability to perform ADL and limited tolerance to standing and walking.      Patient prognosis is Good.   Patientt will benefit from skilled outpatient Physical Therapy to address the deficits stated above and in the chart below, provide patient /family education, and to maximize patientt's level of independence.     Plan of care discussed with patient: Yes  Patient's spiritual, cultural and educational needs considered and patient is agreeable to the plan of care and goals as stated below:     Anticipated Barriers for therapy: none    Medical Necessity is demonstrated by the following  History  Co-morbidities and personal factors that may impact the plan of care Co-morbidities:   young age and h/o previous ACL repair    Personal Factors:   no deficits     moderate   Examination  Body Structures and Functions, activity limitations and participation restrictions that may impact the plan of care Body Regions:   lower extremities    Body Systems:    ROM  strength  gait    Participation Restrictions:   none    Activity limitations:   Learning and applying knowledge  no deficits    General Tasks and Commands  no deficits    Communication  no deficits    Mobility  walking    Self care  no deficits    Domestic Life  no deficits    Interactions/Relationships  no deficits    Life Areas  no deficits    Community and Social Life  no deficits         moderate   Clinical Presentation stable and uncomplicated low   Decision Making/ Complexity Score: low     Goals:    Short Term Goals (STG) # weeks Goal Review Date Reviewed Date Met   1. The patient will begin a written HEP 1 Initial 3/23/2023    2. Increase knee ROM to 0* - 120* 3 Initial 3/23/2023    3. Decrease soft tissue tenderness to moderate 3 Initial 3/23/2023    4. Increase hamstring flexibility to 60* 3 Initial 3/23/2023    5. Increase ankle dorsiflexion to 5* 3 Initial 3/23/2023    6. Increase right knee strength  to 4+/5 3 Initial 3/23/2023      Long Term Goals (LTG) # weeks Goal Review Date Reviewed Date Met   1. The patient will be independent with a HEP for maintenance 6 Initial 3/23/2023    2. Increase knee ROM to 0* to 130* 6 Initial 3/23/2023    3. Increase knee strength to 5/5 6 Initial 3/23/2023    4. The patient will ambulate on a community level without gait deviation . 6 Initial 3/23/2023    5. Decrease FOTO to < 34%. 6 Initial 3/23/2023    6. The patient will be able to ascend/descend 20 step/stairs without onset of symptoms.   6 Initial 3/23/2023        PLAN   Plan of care Certification: 3/23/2023 to 5/5/2023.    Outpatient Physical Therapy 2 times weekly for 6 weeks to include the following interventions: Electrical Stimulation NMES, Gait Training, Manual Therapy, Neuromuscular Re-ed, Patient Education, Therapeutic Activities, and Therapeutic Exercise.     Conrado Medrano, PT      I CERTIFY THE NEED FOR THESE SERVICES FURNISHED UNDER THIS PLAN OF TREATMENT AND WHILE UNDER MY CARE   Physician's comments:     Physician's Signature: ___________________________________________________

## 2023-03-28 ENCOUNTER — CLINICAL SUPPORT (OUTPATIENT)
Dept: REHABILITATION | Facility: HOSPITAL | Age: 23
End: 2023-03-28
Payer: MEDICAID

## 2023-03-28 ENCOUNTER — DOCUMENTATION ONLY (OUTPATIENT)
Dept: REHABILITATION | Facility: HOSPITAL | Age: 23
End: 2023-03-28

## 2023-03-28 DIAGNOSIS — M25.661 DECREASED RANGE OF MOTION OF RIGHT KNEE: ICD-10-CM

## 2023-03-28 DIAGNOSIS — R29.898 DECREASED STRENGTH INVOLVING KNEE JOINT: Primary | ICD-10-CM

## 2023-03-28 DIAGNOSIS — R26.9 GAIT ABNORMALITY: ICD-10-CM

## 2023-03-28 DIAGNOSIS — S80.911A UNSPECIFIED SUPERFICIAL INJURY OF RIGHT KNEE, INITIAL ENCOUNTER: ICD-10-CM

## 2023-03-28 PROCEDURE — 97110 THERAPEUTIC EXERCISES: CPT | Mod: PN,CQ

## 2023-03-28 NOTE — PROGRESS NOTES
30 day visit PT-PTA face-face discussion with ALEJANDRO Medrano re: patient status, POC, and plan for progression done 3/28/23.  Mikala Yates, PTA

## 2023-03-28 NOTE — PROGRESS NOTES
[facility]   Physical Therapy Treatment Note     Name: Khloe BAIN [facility] Number: [id number]    Therapy Diagnosis:   Encounter Diagnoses   Name Primary?    Decreased strength involving knee joint Yes    Decreased range of motion of right knee     Unspecified superficial injury of right knee, initial encounter     Gait abnormality      Physician: Reno Jones MD    Visit Date: 3/28/2023  Physician Orders: PT Eval and Treat   Medical Diagnosis from Referral: Injury of right knee, initial encounter  Evaluation Date: 3/23/2023  Authorization Period Expiration: 2/27/2024  Plan of Care Expiration: 5/5/2023  Visit # / Visits authorized: 2/ 2   (POC 2/12)   FOTO Due visit 5  FOTO Due visit 10      Precautions: Standard     Time In: 300p  Time Out: 359p  Total Billable Time: 25 minutes      SUBJECTIVE     Pt reports: pain in medial R knee region.  She was compliant with home exercise program.  Response to previous treatment: first visit after eval   Functional change: none today    Pain: 7/10  Location: right medial knee      OBJECTIVE     Objective Measures updated at progress report unless specified.     Treatment     Khloe received the treatments listed below:      therapeutic exercises to develop strength, ROM, and flexibility for 49 minutes including:    Long sitting Hamstring stretch 3 x 30 sec  Long sitting Gastroc-soleus stretch 3 x 30 sec  Quad sets 3 x 10  Straight leg raise with hip external rotation 3 x 10  Adductor squeeze 3 x 10  Hook lying hip abduction with Green theraband 3 x 10  Heel slides 3 x 10    Standing Terminal Knee Extension with ball 3 x 10  HR/TR x 30    Manual therapy: x 10 minutes  Myofacial Release with therapy bar to R ITB region  Patellar mobs  Soft Tissue Mobilization to posterior knee, gastroc regions      Patient Education and Home Exercises     Home Exercises Provided and Patient Education Provided     Education provided:   - cont HOME EXERCISE  PROGRAM     Written Home Exercises Provided: Patient instructed to cont prior HEP. Exercises were reviewed and Khloe was able to demonstrate them prior to the end of the session.  Khloe demonstrated good  understanding of the education provided. See EMR under Patient Instructions for exercises provided during therapy sessions    ASSESSMENT     Queenie presents with decreased R knee strength and pain.  She reported decreased pain after Manual Therapy was completed.  Slow pace with exercises.      Khloe Is progressing well towards her goals.   Pt prognosis is Good.     Pt will continue to benefit from skilled outpatient physical therapy to address the deficits listed in the problem list box on initial evaluation, provide pt/family education and to maximize pt's level of independence in the home and community environment.     Pt's spiritual, cultural and educational needs considered and pt agreeable to plan of care and goals.     Anticipated barriers to physical therapy: none    Goals:     Short Term Goals (STG) # weeks Goal Review Date Reviewed Date Met   1. The patient will begin a written HEP 1 ongoing 3/28/2023       2. Increase knee ROM to 0* - 120* 3 ongoing 3/28/2023     3. Decrease soft tissue tenderness to moderate 3 ongoing 3/28/2023         4. Increase hamstring flexibility to 60* 3 ongoing 3/28/2023       5. Increase ankle dorsiflexion to 5* 3 ongoing 3/28/2023       6. Increase right knee strength to 4+/5 3 ongoing 3/28/2023          Long Term Goals (LTG) # weeks Goal Review Date Reviewed Date Met   1. The patient will be independent with a HEP for maintenance 6 ongoing 3/28/2023       2. Increase knee ROM to 0* to 130* 6 ongoing 3/28/2023     3. Increase knee strength to 5/5 6 ongoing 3/28/2023     4. The patient will ambulate on a community level without gait deviation . 6 ongoing 3/28/2023       5. Decrease FOTO to < 34%. 6 ongoing 3/28/2023     6. The patient will be able to  ascend/descend 20 step/stairs without onset of symptoms.  ongoing 3/28/2023          PLAN     Cont per Plan of Care, balance, Range of Motion, R LE strength    Mikala Yates, PTA

## 2023-04-05 ENCOUNTER — CLINICAL SUPPORT (OUTPATIENT)
Dept: REHABILITATION | Facility: HOSPITAL | Age: 23
End: 2023-04-05
Payer: MEDICAID

## 2023-04-05 DIAGNOSIS — M25.661 DECREASED RANGE OF MOTION OF RIGHT KNEE: ICD-10-CM

## 2023-04-05 DIAGNOSIS — R29.898 DECREASED STRENGTH INVOLVING KNEE JOINT: Primary | ICD-10-CM

## 2023-04-05 DIAGNOSIS — R26.9 GAIT ABNORMALITY: ICD-10-CM

## 2023-04-05 DIAGNOSIS — S80.911A UNSPECIFIED SUPERFICIAL INJURY OF RIGHT KNEE, INITIAL ENCOUNTER: ICD-10-CM

## 2023-04-05 PROCEDURE — 97110 THERAPEUTIC EXERCISES: CPT | Mod: PN | Performed by: PHYSICAL THERAPIST

## 2023-04-05 NOTE — PROGRESS NOTES
[facility]   Physical Therapy Treatment Note     Name: Khloe BAIN [facility] Number: [id number]    Therapy Diagnosis:   Encounter Diagnoses   Name Primary?    Decreased strength involving knee joint Yes    Decreased range of motion of right knee     Unspecified superficial injury of right knee, initial encounter     Gait abnormality      Physician: Reno Jones MD    Visit Date: 4/5/2023  Physician Orders: PT Eval and Treat   Medical Diagnosis from Referral: Injury of right knee, initial encounter  Evaluation Date: 3/23/2023  Authorization Period Expiration: 2/27/2024  Plan of Care Expiration: 5/5/2023  Visit # / Visits authorized: 2/ 2   (POC 3/12)   FOTO Due visit 5  FOTO Due visit 10      Precautions: Standard     Time In: 1500  Time Out: 1600  Total Billable Time: 60 minutes      SUBJECTIVE     Pt reports: her pain level is down some today  She was compliant with home exercise program.  Response to previous treatment: first visit after eval   Functional change: none today    Pain: 4/10  Location: right medial knee      OBJECTIVE     Objective Measures updated at progress report unless specified.     Treatment     Khloe received the treatments listed below:    During 30 minutes of therapeutic exercise, Khloe, was supervised by a rehabilitation technician under the direction of the treating therapist.    therapeutic exercises to develop strength, ROM, and flexibility for 49 minutes including:    Long sitting Hamstring stretch 3 x 30 sec  Long sitting Gastroc-soleus stretch 3 x 30 sec  Quad sets 3 x 10  Straight leg raise with hip external rotation 3 x 10  Adductor squeeze 3 x 10  Hook lying hip abduction with Green theraband 3 x 10  Heel slides 3 x 10    Standing Terminal Knee Extension with ball 3 x 10  HR/TR x 30    Isometric hip abduction with contralateral hip hike 3 x 10 B    Shuttle leg press 3 x 10 50#  Shuttle calf press 3 x 10 25#      Manual therapy: x 0  minutes  Myofacial Release with therapy bar to R ITB region  Patellar mobs  Soft Tissue Mobilization to posterior knee, gastroc regions      Patient Education and Home Exercises     Home Exercises Provided and Patient Education Provided     Education provided:   - cont HOME EXERCISE PROGRAM     Written Home Exercises Provided: Patient instructed to cont prior HEP. Exercises were reviewed and Khloe was able to demonstrate them prior to the end of the session.  Khloe demonstrated good  understanding of the education provided. See EMR under Patient Instructions for exercises provided during therapy sessions    ASSESSMENT     Patient presents with decreased right knee pain. Treatment focused on lower extremity strengthening to improve quadriceps firing. Patient tolerated treatment well without report of adverse effects.     Khloe Is progressing well towards her goals.   Pt prognosis is Good.     Pt will continue to benefit from skilled outpatient physical therapy to address the deficits listed in the problem list box on initial evaluation, provide pt/family education and to maximize pt's level of independence in the home and community environment.     Pt's spiritual, cultural and educational needs considered and pt agreeable to plan of care and goals.     Anticipated barriers to physical therapy: none    Goals:     Short Term Goals (STG) # weeks Goal Review Date Reviewed Date Met   1. The patient will begin a written HEP 1 ongoing 4/5/2023       2. Increase knee ROM to 0* - 120* 3 ongoing 4/5/2023     3. Decrease soft tissue tenderness to moderate 3 ongoing 4/5/2023         4. Increase hamstring flexibility to 60* 3 ongoing 4/5/2023       5. Increase ankle dorsiflexion to 5* 3 ongoing 4/5/2023       6. Increase right knee strength to 4+/5 3 ongoing 4/5/2023          Long Term Goals (LTG) # weeks Goal Review Date Reviewed Date Met   1. The patient will be independent with a HEP for maintenance 6  ongoing 4/5/2023       2. Increase knee ROM to 0* to 130* 6 ongoing 4/5/2023     3. Increase knee strength to 5/5 6 ongoing 4/5/2023     4. The patient will ambulate on a community level without gait deviation . 6 ongoing 4/5/2023       5. Decrease FOTO to < 34%. 6 ongoing 4/5/2023     6. The patient will be able to ascend/descend 20 step/stairs without onset of symptoms.  ongoing 4/5/2023          PLAN     Cont per Plan of Care, balance, Range of Motion, R LE strength    Conrado Medrano, PT

## 2023-04-10 ENCOUNTER — CLINICAL SUPPORT (OUTPATIENT)
Dept: REHABILITATION | Facility: HOSPITAL | Age: 23
End: 2023-04-10
Payer: MEDICAID

## 2023-04-10 DIAGNOSIS — M25.661 DECREASED RANGE OF MOTION OF RIGHT KNEE: ICD-10-CM

## 2023-04-10 DIAGNOSIS — S80.911A UNSPECIFIED SUPERFICIAL INJURY OF RIGHT KNEE, INITIAL ENCOUNTER: ICD-10-CM

## 2023-04-10 DIAGNOSIS — R26.9 GAIT ABNORMALITY: ICD-10-CM

## 2023-04-10 DIAGNOSIS — R29.898 DECREASED STRENGTH INVOLVING KNEE JOINT: Primary | ICD-10-CM

## 2023-04-10 PROCEDURE — 97110 THERAPEUTIC EXERCISES: CPT | Mod: PN | Performed by: PHYSICAL THERAPIST

## 2023-04-10 NOTE — PROGRESS NOTES
[facility]   Physical Therapy Treatment Note     Name: Khloe BAIN [facility] Number: [id number]    Therapy Diagnosis:   Encounter Diagnoses   Name Primary?    Decreased strength involving knee joint Yes    Decreased range of motion of right knee     Unspecified superficial injury of right knee, initial encounter     Gait abnormality      Physician: Reno Jones MD    Visit Date: 4/10/2023  Physician Orders: PT Eval and Treat   Medical Diagnosis from Referral: Injury of right knee, initial encounter  Evaluation Date: 3/23/2023  Authorization Period Expiration: 2/27/2024  Plan of Care Expiration: 5/5/2023  Visit # / Visits authorized: 3/ 2   (POC 4/12)   FOTO Due visit 5  FOTO Due visit 10      Precautions: Standard     Time In: 1410  Time Out: 1500  Total Billable Time: 50 minutes      SUBJECTIVE     Pt reports: had increased pain yesterday with Easter activities but doing better today  She was compliant with home exercise program.  Response to previous treatment: first visit after eval   Functional change: none today    Pain: 4/10  Location: right medial knee      OBJECTIVE     Objective Measures updated at progress report unless specified.     Treatment     Khloe received the treatments listed below:    During 30 minutes of therapeutic exercise, Khloe, was supervised by a rehabilitation technician under the direction of the treating therapist.    therapeutic exercises to develop strength, ROM, and flexibility for 50 minutes including:    Long sitting Hamstring stretch 3 x 30 sec  Long sitting Gastroc-soleus stretch 3 x 30 sec  Quad sets 3 x 10  Straight leg raise with hip external rotation 3 x 10  Adductor squeeze 3 x 10  Hook lying hip abduction with Green theraband 3 x 10  Heel slides 3 x 10    Standing Terminal Knee Extension with ball 3 x 10  HR/TR x 30    Isometric hip abduction with contralateral hip hike 3 x 10 B    Shuttle leg press 3 x 10 50#  Shuttle calf press  3 x 10 25#      Manual therapy: x 0 minutes  Myofacial Release with therapy bar to R ITB region  Patellar mobs  Soft Tissue Mobilization to posterior knee, gastroc regions      Patient Education and Home Exercises     Home Exercises Provided and Patient Education Provided     Education provided:   - cont HOME EXERCISE PROGRAM     Written Home Exercises Provided: Patient instructed to cont prior HEP. Exercises were reviewed and Khloe was able to demonstrate them prior to the end of the session.  Khloe demonstrated good  understanding of the education provided. See EMR under Patient Instructions for exercises provided during therapy sessions    ASSESSMENT     Patient presents with decreased pain. She continues to ambulate with her lower extremity externally rotated. She displays limited quad recruitment \with Straight leg raise Patient tolerated treatment well without report of adverse effects.       Khloe Is progressing well towards her goals.   Pt prognosis is Good.     Pt will continue to benefit from skilled outpatient physical therapy to address the deficits listed in the problem list box on initial evaluation, provide pt/family education and to maximize pt's level of independence in the home and community environment.     Pt's spiritual, cultural and educational needs considered and pt agreeable to plan of care and goals.     Anticipated barriers to physical therapy: none    Goals:     Short Term Goals (STG) # weeks Goal Review Date Reviewed Date Met   1. The patient will begin a written HEP 1 ongoing 4/10/2023       2. Increase knee ROM to 0* - 120* 3 ongoing 4/10/2023     3. Decrease soft tissue tenderness to moderate 3 ongoing 4/10/2023         4. Increase hamstring flexibility to 60* 3 ongoing 4/10/2023       5. Increase ankle dorsiflexion to 5* 3 ongoing 4/10/2023       6. Increase right knee strength to 4+/5 3 ongoing 4/10/2023          Long Term Goals (LTG) # weeks Goal Review Date  Reviewed Date Met   1. The patient will be independent with a HEP for maintenance 6 ongoing 4/10/2023       2. Increase knee ROM to 0* to 130* 6 ongoing 4/10/2023     3. Increase knee strength to 5/5 6 ongoing 4/10/2023     4. The patient will ambulate on a community level without gait deviation . 6 ongoing 4/10/2023       5. Decrease FOTO to < 34%. 6 ongoing 4/10/2023     6. The patient will be able to ascend/descend 20 step/stairs without onset of symptoms.  ongoing 4/10/2023          PLAN     Cont per Plan of Care, balance, Range of Motion, R LE strength    Conrado Medrano, PT

## 2023-04-19 ENCOUNTER — CLINICAL SUPPORT (OUTPATIENT)
Dept: REHABILITATION | Facility: HOSPITAL | Age: 23
End: 2023-04-19
Payer: MEDICAID

## 2023-04-19 DIAGNOSIS — R29.898 DECREASED STRENGTH INVOLVING KNEE JOINT: Primary | ICD-10-CM

## 2023-04-19 DIAGNOSIS — S80.911A UNSPECIFIED SUPERFICIAL INJURY OF RIGHT KNEE, INITIAL ENCOUNTER: ICD-10-CM

## 2023-04-19 DIAGNOSIS — M25.661 DECREASED RANGE OF MOTION OF RIGHT KNEE: ICD-10-CM

## 2023-04-19 DIAGNOSIS — R26.9 GAIT ABNORMALITY: ICD-10-CM

## 2023-04-19 PROCEDURE — 97110 THERAPEUTIC EXERCISES: CPT | Mod: PN

## 2023-04-19 NOTE — PROGRESS NOTES
[facility]   Physical Therapy Treatment Note     Name: Khloe BAIN [facility] Number: [id number]    Therapy Diagnosis:   Encounter Diagnoses   Name Primary?    Decreased strength involving knee joint Yes    Decreased range of motion of right knee     Unspecified superficial injury of right knee, initial encounter     Gait abnormality      Physician: Reno Jones MD    Visit Date: 4/19/2023  Physician Orders: PT Eval and Treat   Medical Diagnosis from Referral: Injury of right knee, initial encounter  Evaluation Date: 3/23/2023  Authorization Period Expiration: 2/27/2024  Plan of Care Expiration: 5/5/2023  Visit # / Visits authorized: 4/ 2   (POC 4/12)   FOTO Due visit 5  FOTO Due visit 10      Precautions: Standard     Time In: 1450  Time Out: 1545  Total Billable Time: 55 minutes      SUBJECTIVE     Pt reports: had increased pain yesterday with Easter activities but doing better today  She was compliant with home exercise program.  Response to previous treatment: first visit after eval   Functional change: none today    Pain: 5/10  Location: right medial knee      OBJECTIVE     Objective Measures updated at progress report unless specified.     Treatment     Khloe received the treatments listed below:      therapeutic exercises to develop strength, ROM, and flexibility for 55 minutes including:    Long sitting Hamstring stretch 3 x 30 sec  Long sitting Gastroc-soleus stretch 3 x 30 sec  Quad sets 3 x 10  Straight leg raise with hip external rotation 3 x 10  Adductor squeeze 3 x 10  Hook lying hip abduction with Green theraband 3 x 10  Heel slides 3 x 10  TKE 2# 3X10  Standing Terminal Knee Extension with ball 3 x 10  HR/TR x 30  Gastroc stretch 3x30.  BOSU squats 30  Isometric hip abduction with contralateral hip hike 3 x 10 B    Shuttle leg press 5' 37#  Shuttle calf press 3 x 10 25#    Manual therapy: x 0 minutes  Myofacial Release with therapy bar to R ITB region  Patellar  mobs  Soft Tissue Mobilization to posterior knee, gastroc regions      Patient Education and Home Exercises     Home Exercises Provided and Patient Education Provided     Education provided:   - cont HOME EXERCISE PROGRAM     Written Home Exercises Provided: Patient instructed to cont prior HEP. Exercises were reviewed and Khloe was able to demonstrate them prior to the end of the session.  Khloe demonstrated good  understanding of the education provided. See EMR under Patient Instructions for exercises provided during therapy sessions    ASSESSMENT     Patient presents with decreased pain. She continues to ambulate with her lower extremity externally rotated. She displays limited quad recruitment \with Straight leg raise Patient tolerated treatment well without report of adverse effects.       Khloe Is progressing well towards her goals.   Pt prognosis is Good.     Pt will continue to benefit from skilled outpatient physical therapy to address the deficits listed in the problem list box on initial evaluation, provide pt/family education and to maximize pt's level of independence in the home and community environment.     Pt's spiritual, cultural and educational needs considered and pt agreeable to plan of care and goals.     Anticipated barriers to physical therapy: none    Goals:     Short Term Goals (STG) # weeks Goal Review Date Reviewed Date Met   1. The patient will begin a written HEP 1 ongoing 4/19/2023       2. Increase knee ROM to 0* - 120* 3 ongoing 4/19/2023     3. Decrease soft tissue tenderness to moderate 3 ongoing 4/19/2023         4. Increase hamstring flexibility to 60* 3 ongoing 4/19/2023       5. Increase ankle dorsiflexion to 5* 3 ongoing 4/19/2023       6. Increase right knee strength to 4+/5 3 ongoing 4/19/2023          Long Term Goals (LTG) # weeks Goal Review Date Reviewed Date Met   1. The patient will be independent with a HEP for maintenance 6 ongoing 4/19/2023        2. Increase knee ROM to 0* to 130* 6 ongoing 4/19/2023     3. Increase knee strength to 5/5 6 ongoing 4/19/2023     4. The patient will ambulate on a community level without gait deviation . 6 ongoing 4/19/2023       5. Decrease FOTO to < 34%. 6 ongoing 4/19/2023     6. The patient will be able to ascend/descend 20 step/stairs without onset of symptoms.  ongoing 4/19/2023          PLAN     Cont per Plan of Care, balance, Range of Motion, R LE strength    Len Mead, PT

## 2023-04-21 ENCOUNTER — CLINICAL SUPPORT (OUTPATIENT)
Dept: REHABILITATION | Facility: HOSPITAL | Age: 23
End: 2023-04-21
Payer: MEDICAID

## 2023-04-21 DIAGNOSIS — M25.661 DECREASED RANGE OF MOTION OF RIGHT KNEE: ICD-10-CM

## 2023-04-21 DIAGNOSIS — R29.898 DECREASED STRENGTH INVOLVING KNEE JOINT: Primary | ICD-10-CM

## 2023-04-21 DIAGNOSIS — S80.911A UNSPECIFIED SUPERFICIAL INJURY OF RIGHT KNEE, INITIAL ENCOUNTER: ICD-10-CM

## 2023-04-21 DIAGNOSIS — R26.9 GAIT ABNORMALITY: ICD-10-CM

## 2023-04-21 PROCEDURE — 97110 THERAPEUTIC EXERCISES: CPT | Mod: PN | Performed by: PHYSICAL THERAPIST

## 2023-04-21 NOTE — PROGRESS NOTES
[facility] [place] [facility]   Physical Therapy Treatment Note     Name: Khloe BAIN [facility] Number: [id number]    Therapy Diagnosis:   Encounter Diagnoses   Name Primary?    Decreased strength involving knee joint Yes    Decreased range of motion of right knee     Unspecified superficial injury of right knee, initial encounter     Gait abnormality      Physician: Reno Jones MD    Visit Date: 4/21/2023  Physician Orders: PT Eval and Treat   Medical Diagnosis from Referral: Injury of right knee, initial encounter  Evaluation Date: 3/23/2023  Authorization Period Expiration: 2/27/2024  Plan of Care Expiration: 5/5/2023  Visit # / Visits authorized: 6/ 12   (POC 5/12)   FOTO Due visit 5  FOTO Due visit 10      Precautions: Standard     Time In: 1450  Time Out: 1545  Total Billable Time: 55 minutes      SUBJECTIVE     Pt reports: had increased pain yesterday with Easter activities but doing better today  She was compliant with home exercise program.  Response to previous treatment: first visit after eval   Functional change: none today    Pain: 5/10  Location: right medial knee      OBJECTIVE     Objective Measures updated at progress report unless specified.     Treatment     Khloe received the treatments listed below:      therapeutic exercises to develop strength, ROM, and flexibility for 55 minutes including:    Long sitting Hamstring stretch 3 x 30 sec  Long sitting Gastroc-soleus stretch 3 x 30 sec  Quad sets 3 x 10  Straight leg raise with hip external rotation 3 x 10  Adductor squeeze 3 x 10  Hook lying hip abduction with Green theraband 3 x 10  Heel slides 3 x 10  TKE 2# 3X10    Standing Terminal Knee Extension with ball 3 x 10  HR/TR x 30  Slant board Gastroc stretch 3x30  BOSU squats 30  Isometric hip abduction with contralateral hip hike 3 x 10 Bilateral     Shuttle leg press 3 x 10  37#  Shuttle calf press 3 x 10  25#    Manual therapy: x 0 minutes  Myofacial Release with therapy bar  to R ITB region  Patellar mobs  Soft Tissue Mobilization to posterior knee, gastroc regions      Patient Education and Home Exercises     Home Exercises Provided and Patient Education Provided     Education provided:   - cont HOME EXERCISE PROGRAM     Written Home Exercises Provided: Patient instructed to cont prior HEP. Exercises were reviewed and Khloe was able to demonstrate them prior to the end of the session.  Khloe demonstrated good  understanding of the education provided. See EMR under Patient Instructions for exercises provided during therapy sessions    ASSESSMENT     Patient presents with decreased pain. She continues to ambulate with her lower extremity externally rotated. She displays limited quad recruitment \with Straight leg raise Patient tolerated treatment well without report of adverse effects.       Khloe Is progressing well towards her goals.   Pt prognosis is Good.     Pt will continue to benefit from skilled outpatient physical therapy to address the deficits listed in the problem list box on initial evaluation, provide pt/family education and to maximize pt's level of independence in the home and community environment.     Pt's spiritual, cultural and educational needs considered and pt agreeable to plan of care and goals.     Anticipated barriers to physical therapy: none    Goals:     Short Term Goals (STG) # weeks Goal Review Date Reviewed Date Met   1. The patient will begin a written HEP 1 ongoing 4/21/2023       2. Increase knee ROM to 0* - 120* 3 ongoing 4/21/2023     3. Decrease soft tissue tenderness to moderate 3 ongoing 4/21/2023         4. Increase hamstring flexibility to 60* 3 ongoing 4/21/2023       5. Increase ankle dorsiflexion to 5* 3 ongoing 4/21/2023       6. Increase right knee strength to 4+/5 3 ongoing 4/21/2023          Long Term Goals (LTG) # weeks Goal Review Date Reviewed Date Met   1. The patient will be independent with a HEP for maintenance  6 ongoing 4/21/2023       2. Increase knee ROM to 0* to 130* 6 ongoing 4/21/2023     3. Increase knee strength to 5/5 6 ongoing 4/21/2023     4. The patient will ambulate on a community level without gait deviation . 6 ongoing 4/21/2023       5. Decrease FOTO to < 34%. 6 ongoing 4/21/2023     6. The patient will be able to ascend/descend 20 step/stairs without onset of symptoms.  ongoing 4/21/2023          PLAN     Cont per Plan of Care, balance, Range of Motion, R LE strength    Mikala Yates, PTA

## 2023-04-21 NOTE — PROGRESS NOTES
[facility]   Physical Therapy Treatment Note     Name: Khloe BAIN [facility] Number: [id number]    Therapy Diagnosis:   Encounter Diagnoses   Name Primary?    Decreased strength involving knee joint Yes    Decreased range of motion of right knee     Unspecified superficial injury of right knee, initial encounter     Gait abnormality      Physician: Reno Jones MD    Visit Date: 4/21/2023  Physician Orders: PT Eval and Treat   Medical Diagnosis from Referral: Injury of right knee, initial encounter  Evaluation Date: 3/23/2023  Authorization Period Expiration: 2/27/2024  Plan of Care Expiration: 5/5/2023  Visit # / Visits authorized: 5/12  (POC 6/12)   FOTO Due visit 5 4/21/2023  FOTO Due visit 10      Precautions: Standard     Time In: 1500  Time Out: 1600  Total Billable Time: 60 minutes      SUBJECTIVE     Pt reports: she feels some weakness today,  She was compliant with home exercise program.  Response to previous treatment: first visit after eval   Functional change: none today    Pain: 5/10  Location: right medial knee      OBJECTIVE     Objective Measures updated at progress report unless specified.     Treatment     Khloe received the treatments listed below:      therapeutic exercises to develop strength, ROM, and flexibility for 60 minutes including:    Long sitting Hamstring stretch 3 x 30 sec  Long sitting Gastroc-soleus stretch 3 x 30 sec  Quad sets 3 x 10  Straight leg raise with hip external rotation 3 x 10  Side lying abduction 3 x 10  Prone extension 3 x 10  Bridging 3 x 10  Adductor squeeze 3 x 10  Hook lying hip abduction with Green theraband 3 x 10  Heel slides 3 x 10  TKE 2# 3X10  Standing Terminal Knee Extension with ball 3 x 10  HR/TR x 30  Gastroc stretch 3x30.  BOSU squats 30  Isometric hip abduction with contralateral hip hike 3 x 10 B    Shuttle leg press 5' 37#  Shuttle calf press 3 x 10 25#    KT-Taping for anterior knee pain. Medial and lateral I  strips with J around patella and base strip at inferior pole    Patient Education and Home Exercises     Home Exercises Provided and Patient Education Provided     Education provided:   - cont HOME EXERCISE PROGRAM     Written Home Exercises Provided: Patient instructed to cont prior HEP. Exercises were reviewed and Khloe was able to demonstrate them prior to the end of the session.  Khloe demonstrated good  understanding of the education provided. See EMR under Patient Instructions for exercises provided during therapy sessions    ASSESSMENT     Patient presents with decreased right lower extremity strength. She has made a slight improvement on her FOTO score. Patient tolerated treatment well without report of adverse effects.       Khloe Is progressing well towards her goals.   Pt prognosis is Good.     Pt will continue to benefit from skilled outpatient physical therapy to address the deficits listed in the problem list box on initial evaluation, provide pt/family education and to maximize pt's level of independence in the home and community environment.     Pt's spiritual, cultural and educational needs considered and pt agreeable to plan of care and goals.     Anticipated barriers to physical therapy: none    Goals:     Short Term Goals (STG) # weeks Goal Review Date Reviewed Date Met   1. The patient will begin a written HEP 1 ongoing 4/21/2023       2. Increase knee ROM to 0* - 120* 3 ongoing 4/21/2023     3. Decrease soft tissue tenderness to moderate 3 ongoing 4/21/2023         4. Increase hamstring flexibility to 60* 3 ongoing 4/21/2023       5. Increase ankle dorsiflexion to 5* 3 ongoing 4/21/2023       6. Increase right knee strength to 4+/5 3 ongoing 4/21/2023          Long Term Goals (LTG) # weeks Goal Review Date Reviewed Date Met   1. The patient will be independent with a HEP for maintenance 6 ongoing 4/21/2023       2. Increase knee ROM to 0* to 130* 6 ongoing 4/21/2023     3.  Increase knee strength to 5/5 6 ongoing 4/21/2023     4. The patient will ambulate on a community level without gait deviation . 6 ongoing 4/21/2023       5. Decrease FOTO to < 34%. 6 ongoing 4/21/2023     6. The patient will be able to ascend/descend 20 step/stairs without onset of symptoms.  ongoing 4/21/2023          PLAN     Cont per Plan of Care, balance, Range of Motion, R LE strength    Conrado Medrano, PT

## 2023-04-24 ENCOUNTER — CLINICAL SUPPORT (OUTPATIENT)
Dept: REHABILITATION | Facility: HOSPITAL | Age: 23
End: 2023-04-24
Payer: MEDICAID

## 2023-04-24 DIAGNOSIS — R29.898 DECREASED STRENGTH INVOLVING KNEE JOINT: Primary | ICD-10-CM

## 2023-04-24 DIAGNOSIS — M25.661 DECREASED RANGE OF MOTION OF RIGHT KNEE: ICD-10-CM

## 2023-04-24 DIAGNOSIS — R26.9 GAIT ABNORMALITY: ICD-10-CM

## 2023-04-24 DIAGNOSIS — S80.911A UNSPECIFIED SUPERFICIAL INJURY OF RIGHT KNEE, INITIAL ENCOUNTER: ICD-10-CM

## 2023-04-24 PROCEDURE — 97110 THERAPEUTIC EXERCISES: CPT | Mod: PN,CQ

## 2023-04-24 NOTE — PROGRESS NOTES
[facility]   Physical Therapy Treatment Note     Name: Khloe BAIN [facility] Number: [id number]    Therapy Diagnosis:   Encounter Diagnoses   Name Primary?    Decreased strength involving knee joint Yes    Decreased range of motion of right knee     Unspecified superficial injury of right knee, initial encounter     Gait abnormality      Physician: Reno Jones MD    Visit Date: 4/24/2023  Physician Orders: PT Eval and Treat   Medical Diagnosis from Referral: Injury of right knee, initial encounter  Evaluation Date: 3/23/2023  Authorization Period Expiration: 2/27/2024  Plan of Care Expiration: 5/5/2023  Visit # / Visits authorized: 6/12  (POC 7/12)   FOTO Due visit 5 4/21/2023  FOTO Due visit 10      Precautions: Standard     Time In: 300p  Time Out: 400p  Total Billable Time: 30 minutes      SUBJECTIVE     Pt reports: she just got off work and had a busy weekend  She was compliant with home exercise program.  Response to previous treatment: good  Functional change: none today    Pain: 7/10  Location: right medial knee      OBJECTIVE     Objective Measures updated at progress report unless specified.     Treatment     Khloe received the treatments listed below:      therapeutic exercises to develop strength, ROM, and flexibility for 60 minutes including:    Long sitting Hamstring stretch 3 x 30 sec  NOT PERFORMED   Long sitting Gastroc-soleus stretch 3 x 30 sec  NOT PERFORMED   Quad sets 3 x 10  NOT PERFORMED   Straight leg raise with hip external rotation 1# 3 x 10  Side lying abduction 1# 3 x 10  Prone extension 3 x 10  NOT PERFORMED   Bridging 3 x 10  Adductor squeeze 3 x 10  NOT PERFORMED   Hook lying hip abduction with Green theraband 3 x 10  NOT PERFORMED   Heel slides 3 x 10  NOT PERFORMED   TKE 2# 3X10  NOT PERFORMED     Standing Terminal Knee Extension with ball 3 x 10  HR/TR x 30 airex  Slant board Gastroc stretch 3x30 sec Bilateral   Physioball wall squats x  30  Isometric hip abduction with contralateral hip hike 3 x 10 Bilateral     Shuttle leg press x 30 50#  Shuttle calf press 3 x 10 25#    KT-Taping for anterior knee pain. Medial and lateral I strips with J around patella and base strip at inferior pole    Patient Education and Home Exercises     Home Exercises Provided and Patient Education Provided     Education provided:   - cont HOME EXERCISE PROGRAM     Written Home Exercises Provided: Patient instructed to cont prior HEP. Exercises were reviewed and Khloe was able to demonstrate them prior to the end of the session.  Khloe demonstrated good  understanding of the education provided. See EMR under Patient Instructions for exercises provided during therapy sessions    ASSESSMENT     Queenie presented with increased R knee pain after a busy weekend and working all day.  She was able to perform PRE's with good form and reported fatigue.         Khloe Is progressing well towards her goals.   Pt prognosis is Good.     Pt will continue to benefit from skilled outpatient physical therapy to address the deficits listed in the problem list box on initial evaluation, provide pt/family education and to maximize pt's level of independence in the home and community environment.     Pt's spiritual, cultural and educational needs considered and pt agreeable to plan of care and goals.     Anticipated barriers to physical therapy: none    Goals:     Short Term Goals (STG) # weeks Goal Review Date Reviewed Date Met   1. The patient will begin a written HEP 1 ongoing 4/24/2023       2. Increase knee ROM to 0* - 120* 3 ongoing 4/24/2023     3. Decrease soft tissue tenderness to moderate 3 ongoing 4/24/2023         4. Increase hamstring flexibility to 60* 3 ongoing 4/24/2023       5. Increase ankle dorsiflexion to 5* 3 ongoing 4/24/2023       6. Increase right knee strength to 4+/5 3 ongoing 4/24/2023          Long Term Goals (LTG) # weeks Goal Review Date Reviewed  Date Met   1. The patient will be independent with a HEP for maintenance 6 ongoing 4/24/2023       2. Increase knee ROM to 0* to 130* 6 ongoing 4/24/2023     3. Increase knee strength to 5/5 6 ongoing 4/24/2023     4. The patient will ambulate on a community level without gait deviation . 6 ongoing 4/24/2023       5. Decrease FOTO to < 34%. 6 ongoing 4/24/2023     6. The patient will be able to ascend/descend 20 step/stairs without onset of symptoms.  ongoing 4/24/2023          PLAN     Cont per Plan of Care, balance, Range of Motion, R LE strength    Mikala Yates, PTA

## 2023-04-25 ENCOUNTER — DOCUMENTATION ONLY (OUTPATIENT)
Dept: REHABILITATION | Facility: HOSPITAL | Age: 23
End: 2023-04-25
Payer: MEDICAID

## 2023-04-25 NOTE — PROGRESS NOTES
30 day visit PT-PTA face-face discussion with ALEJANDRO Medrano re: patient status, POC, and plan for progression done 4/25/23.  Mikala Yates, PTA

## 2023-04-26 ENCOUNTER — CLINICAL SUPPORT (OUTPATIENT)
Dept: REHABILITATION | Facility: HOSPITAL | Age: 23
End: 2023-04-26
Payer: MEDICAID

## 2023-04-26 DIAGNOSIS — M25.661 DECREASED RANGE OF MOTION OF RIGHT KNEE: ICD-10-CM

## 2023-04-26 DIAGNOSIS — R29.898 DECREASED STRENGTH INVOLVING KNEE JOINT: Primary | ICD-10-CM

## 2023-04-26 DIAGNOSIS — S80.911A UNSPECIFIED SUPERFICIAL INJURY OF RIGHT KNEE, INITIAL ENCOUNTER: ICD-10-CM

## 2023-04-26 DIAGNOSIS — R26.9 GAIT ABNORMALITY: ICD-10-CM

## 2023-04-26 PROCEDURE — 97110 THERAPEUTIC EXERCISES: CPT | Mod: PN | Performed by: PHYSICAL THERAPIST

## 2023-04-26 NOTE — PROGRESS NOTES
[facility]   Physical Therapy Treatment Note     Name: Khloe BAIN [facility] Number: [id number]    Therapy Diagnosis:   Encounter Diagnoses   Name Primary?    Decreased strength involving knee joint Yes    Decreased range of motion of right knee     Unspecified superficial injury of right knee, initial encounter     Gait abnormality      Physician: Reno Jones MD    Visit Date: 4/26/2023  Physician Orders: PT Eval and Treat   Medical Diagnosis from Referral: Injury of right knee, initial encounter  Evaluation Date: 3/23/2023  Authorization Period Expiration: 2/27/2024  Plan of Care Expiration: 5/5/2023  Visit # / Visits authorized: 7/12  (POC 8/12)   FOTO Due visit 5 4/21/2023  FOTO Due visit 10      Precautions: Standard     Time In: 1500  Time Out: 1600  Total Billable Time: 30 minutes      SUBJECTIVE     Pt reports: doing good today after full day at work  She was compliant with home exercise program.  Response to previous treatment: good  Functional change: none today    Pain: 3/10  Location: right medial knee      OBJECTIVE     Objective Measures updated at progress report unless specified.     Treatment     Khloe received the treatments listed below:      therapeutic exercises to develop strength, ROM, and flexibility for 60 minutes including:      Straight leg raise with hip external rotation 1# 3 x 10  Side lying abduction 1# 3 x 10  Prone extension 3 x 10    Bridging 3 x 10  Adductor squeeze with bridge 3 x 10    Hook lying hip abduction with bridge Green theraband 3 x 10       Standing Terminal Knee Extension with ball 3 x 10  HR/TR x 30 airex  Slant board Gastroc stretch 3x30 sec Bilateral   Physioball wall squats x 30  Isometric hip abduction with contralateral hip hike 3 x 10 Bilateral     Shuttle leg press x 30 50#  Shuttle calf press 3 x 10 25#    Cable column retro walks x 10 13#  Cable column lateral side steps x 10 10#  Cable column TKE 3 x 10 10#        Patient Education and Home Exercises     Home Exercises Provided and Patient Education Provided     Education provided:   - cont HOME EXERCISE PROGRAM     Written Home Exercises Provided: Patient instructed to cont prior HEP. Exercises were reviewed and Khloe was able to demonstrate them prior to the end of the session.  Khloe demonstrated good  understanding of the education provided. See EMR under Patient Instructions for exercises provided during therapy sessions    ASSESSMENT     The patient displays visible muscular fatigue with closed chain exercises. Patient tolerated treatment well without report of adverse effects.      Khloe Is progressing well towards her goals.   Pt prognosis is Good.     Pt will continue to benefit from skilled outpatient physical therapy to address the deficits listed in the problem list box on initial evaluation, provide pt/family education and to maximize pt's level of independence in the home and community environment.     Pt's spiritual, cultural and educational needs considered and pt agreeable to plan of care and goals.     Anticipated barriers to physical therapy: none    Goals:     Short Term Goals (STG) # weeks Goal Review Date Reviewed Date Met   1. The patient will begin a written HEP 1 ongoing 4/26/2023       2. Increase knee ROM to 0* - 120* 3 ongoing 4/26/2023     3. Decrease soft tissue tenderness to moderate 3 ongoing 4/26/2023         4. Increase hamstring flexibility to 60* 3 ongoing 4/26/2023       5. Increase ankle dorsiflexion to 5* 3 ongoing 4/26/2023       6. Increase right knee strength to 4+/5 3 ongoing 4/26/2023          Long Term Goals (LTG) # weeks Goal Review Date Reviewed Date Met   1. The patient will be independent with a HEP for maintenance 6 ongoing 4/26/2023       2. Increase knee ROM to 0* to 130* 6 ongoing 4/26/2023     3. Increase knee strength to 5/5 6 ongoing 4/26/2023     4. The patient will ambulate on a community  level without gait deviation . 6 ongoing 4/26/2023       5. Decrease FOTO to < 34%. 6 ongoing 4/26/2023     6. The patient will be able to ascend/descend 20 step/stairs without onset of symptoms.  ongoing 4/26/2023          PLAN     Cont per Plan of Care, balance, Range of Motion, R LE strength    Conrado Medrano, PT

## 2023-05-01 ENCOUNTER — CLINICAL SUPPORT (OUTPATIENT)
Dept: REHABILITATION | Facility: HOSPITAL | Age: 23
End: 2023-05-01
Payer: MEDICAID

## 2023-05-01 DIAGNOSIS — S80.911A UNSPECIFIED SUPERFICIAL INJURY OF RIGHT KNEE, INITIAL ENCOUNTER: ICD-10-CM

## 2023-05-01 DIAGNOSIS — R26.9 GAIT ABNORMALITY: ICD-10-CM

## 2023-05-01 DIAGNOSIS — M25.661 DECREASED RANGE OF MOTION OF RIGHT KNEE: ICD-10-CM

## 2023-05-01 DIAGNOSIS — R29.898 DECREASED STRENGTH INVOLVING KNEE JOINT: Primary | ICD-10-CM

## 2023-05-01 PROCEDURE — 97110 THERAPEUTIC EXERCISES: CPT | Mod: PN,CQ

## 2023-05-01 NOTE — PROGRESS NOTES
[facility] OUTPATIENT THERAPY AND WELLNESS   Physical Therapy Treatment Note     Name: Khloe BAIN [facility] Number: [id number]    Therapy Diagnosis:   Encounter Diagnoses   Name Primary?    Decreased strength involving knee joint Yes    Decreased range of motion of right knee     Unspecified superficial injury of right knee, initial encounter     Gait abnormality      Physician: Reno Jones MD    Visit Date: 5/1/2023  Physician Orders: PT Eval and Treat   Medical Diagnosis from Referral: Injury of right knee, initial encounter  Evaluation Date: 3/23/2023  Authorization Period Expiration: 2/27/2024  Plan of Care Expiration: 5/5/2023  Visit # / Visits authorized: 8/12  (POC 9/12)   FOTO Due visit 5 4/21/2023  FOTO Due visit 10      Precautions: Standard     Time In: 300p  Time Out: 400p  Total Billable Time: 60 minutes      SUBJECTIVE     Pt reports: 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  Response to previous treatment: no issues  Functional change: none today    Pain: 7/10  Location: right medial knee      OBJECTIVE     Objective Measures updated at progress report unless specified.     Treatment     Khloe received the treatments listed below:      therapeutic exercises to develop strength, ROM, and flexibility for 60 (medicaid) minutes including:      Straight leg raise with hip external rotation 1# 3 x 10  Side lying abduction 1# 3 x 10  Prone extension 3 x 10    Bridging 3 x 10  Adductor squeeze with bridge 3 x 10    Hook lying hip abduction with bridge Green theraband 3 x 10       Standing Terminal Knee Extension with ball 3 x 10  HR/TR x 30 airex  Slant board Gastroc stretch 3x30 sec Bilateral   Physioball wall squats x 30  Isometric hip abduction with contralateral hip hike 3 x 10 Bilateral     Shuttle leg press x 30 50#  Shuttle calf press 3 x 10 25#    Cable column retro walks x 10 13#  Cable  column lateral side steps x 10 10#  Cable column TKE 3 x 10 10#       Patient Education and Home Exercises     Home Exercises Provided and Patient Education Provided     Education provided:   - cont HOME EXERCISE PROGRAM     Written Home Exercises Provided: Patient instructed to cont prior HEP. Exercises were reviewed and Khloe was able to demonstrate them prior to the end of the session.  Khloe demonstrated good  understanding of the education provided. See EMR under Patient Instructions for exercises provided during therapy sessions    ASSESSMENT     Queenie continues to report pain with standing for long amounts of time.  Tolerated all PRE's with good form.      Khloe Is progressing well towards her goals.   Pt prognosis is Good.     Pt will continue to benefit from skilled outpatient physical therapy to address the deficits listed in the problem list box on initial evaluation, provide pt/family education and to maximize pt's level of independence in the home and community environment.     Pt's spiritual, cultural and educational needs considered and pt agreeable to plan of care and goals.     Anticipated barriers to physical therapy: none    Goals:     Short Term Goals (STG) # weeks Goal Review Date Reviewed Date Met   1. The patient will begin a written HEP 1 ongoing 5/1/2023       2. Increase knee ROM to 0* - 120* 3 ongoing 5/1/2023     3. Decrease soft tissue tenderness to moderate 3 ongoing 5/1/2023         4. Increase hamstring flexibility to 60* 3 ongoing 5/1/2023       5. Increase ankle dorsiflexion to 5* 3 ongoing 5/1/2023       6. Increase right knee strength to 4+/5 3 ongoing 5/1/2023          Long Term Goals (LTG) # weeks Goal Review Date Reviewed Date Met   1. The patient will be independent with a HEP for maintenance 6 ongoing 5/1/2023       2. Increase knee ROM to 0* to 130* 6 ongoing 5/1/2023     3. Increase knee strength to 5/5 6 ongoing 5/1/2023     4. The patient will ambulate  on a community level without gait deviation . 6 ongoing 5/1/2023       5. Decrease FOTO to < 34%. 6 ongoing 5/1/2023     6. The patient will be able to ascend/descend 20 step/stairs without onset of symptoms.  ongoing 5/1/2023          PLAN     Cont per Plan of Care, balance, Range of Motion, R LE strength    Mikala Yates, PTA

## 2023-05-04 ENCOUNTER — DOCUMENTATION ONLY (OUTPATIENT)
Dept: REHABILITATION | Facility: HOSPITAL | Age: 23
End: 2023-05-04
Payer: MEDICAID

## 2023-05-04 ENCOUNTER — CLINICAL SUPPORT (OUTPATIENT)
Dept: REHABILITATION | Facility: HOSPITAL | Age: 23
End: 2023-05-04
Payer: MEDICAID

## 2023-05-04 DIAGNOSIS — R29.898 DECREASED STRENGTH INVOLVING KNEE JOINT: Primary | ICD-10-CM

## 2023-05-04 DIAGNOSIS — M25.661 DECREASED RANGE OF MOTION OF RIGHT KNEE: ICD-10-CM

## 2023-05-04 DIAGNOSIS — S80.911A UNSPECIFIED SUPERFICIAL INJURY OF RIGHT KNEE, INITIAL ENCOUNTER: ICD-10-CM

## 2023-05-04 DIAGNOSIS — R26.9 GAIT ABNORMALITY: ICD-10-CM

## 2023-05-04 PROCEDURE — 97110 THERAPEUTIC EXERCISES: CPT | Mod: PN | Performed by: PHYSICAL THERAPIST

## 2023-05-04 NOTE — PROGRESS NOTES
PT/PTA met face to face to discuss pt's treatment plan and progress towards established goals. Pt will be seen by a physical therapist minimally every 6th visit or every 30 days.    Mikala Yates PTA

## 2023-05-04 NOTE — PROGRESS NOTES
[facility]   Physical Therapy Treatment Note     Name: Khloe BAIN [facility] Number: [id number]    Therapy Diagnosis:   Encounter Diagnoses   Name Primary?    Decreased strength involving knee joint Yes    Decreased range of motion of right knee     Unspecified superficial injury of right knee, initial encounter     Gait abnormality      Physician: Reno Jones MD    Visit Date: 5/4/2023  Physician Orders: PT Eval and Treat   Medical Diagnosis from Referral: Injury of right knee, initial encounter  Evaluation Date: 3/23/2023  Authorization Period Expiration: 2/27/2024  Plan of Care Expiration: 5/5/2023  Visit # / Visits authorized: 9/12  (POC 10/12)   FOTO Due visit 5 4/21/2023  FOTO Due visit 10      Precautions: Standard     Time In: 1500  Time Out: 1600  Total Billable Time: 60 minutes      SUBJECTIVE     Pt reports: posterior right knee pain  She was compliant with home exercise program.  Response to previous treatment: no issues  Functional change: none today    Pain: 7/10  Location: right medial knee      OBJECTIVE     Objective Measures updated at progress report unless specified.     Treatment     Khloe received the treatments listed below:      therapeutic exercises to develop strength, ROM, and flexibility for 60 (medicaid) minutes including:      Straight leg raise with hip external rotation 1# 3 x 10  Side lying abduction 1# 3 x 10  Prone extension 3 x 10    Bridging 3 x 10  DKTC with Physioball 3 x 10 HS strengthening  Adductor squeeze with bridge 3 x 10    Hook lying hip abduction with bridge Green theraband 3 x 10       Standing Terminal Knee Extension with ball 3 x 10  HR/TR x 30 airex  Slant board Gastroc stretch 3x30 sec Bilateral   Physioball wall squats x 30  Isometric hip abduction with contralateral hip hike 3 x 10 Bilateral     Shuttle leg press x 30 50#  Shuttle calf press 3 x 10 25#    Cable column retro walks x 10 13#  Cable column lateral side steps x  10 10#  Cable column TKE 3 x 10 10#       Patient Education and Home Exercises     Home Exercises Provided and Patient Education Provided     Education provided:   - cont HOME EXERCISE PROGRAM     Written Home Exercises Provided: Patient instructed to cont prior HEP. Exercises were reviewed and Khloe was able to demonstrate them prior to the end of the session.  Khloe demonstrated good  understanding of the education provided. See EMR under Patient Instructions for exercises provided during therapy sessions    ASSESSMENT     Pain noted with palpation of semimembranous and semitendinosus, Patient advised to continue hamstring stretching      Khloe Is progressing well towards her goals.   Pt prognosis is Good.     Pt will continue to benefit from skilled outpatient physical therapy to address the deficits listed in the problem list box on initial evaluation, provide pt/family education and to maximize pt's level of independence in the home and community environment.     Pt's spiritual, cultural and educational needs considered and pt agreeable to plan of care and goals.     Anticipated barriers to physical therapy: none    Goals:     Short Term Goals (STG) # weeks Goal Review Date Reviewed Date Met   1. The patient will begin a written HEP 1 ongoing 5/4/2023       2. Increase knee ROM to 0* - 120* 3 ongoing 5/4/2023     3. Decrease soft tissue tenderness to moderate 3 ongoing 5/4/2023         4. Increase hamstring flexibility to 60* 3 ongoing 5/4/2023       5. Increase ankle dorsiflexion to 5* 3 ongoing 5/4/2023       6. Increase right knee strength to 4+/5 3 ongoing 5/4/2023          Long Term Goals (LTG) # weeks Goal Review Date Reviewed Date Met   1. The patient will be independent with a HEP for maintenance 6 ongoing 5/4/2023       2. Increase knee ROM to 0* to 130* 6 ongoing 5/4/2023     3. Increase knee strength to 5/5 6 ongoing 5/4/2023     4. The patient will ambulate on a community level  without gait deviation . 6 ongoing 5/4/2023       5. Decrease FOTO to < 34%. 6 ongoing 5/4/2023     6. The patient will be able to ascend/descend 20 step/stairs without onset of symptoms.  ongoing 5/4/2023          PLAN     Cont per Plan of Care, balance, Range of Motion, R LE strength    Conrado Medrano, PT

## 2023-05-15 ENCOUNTER — CLINICAL SUPPORT (OUTPATIENT)
Dept: REHABILITATION | Facility: HOSPITAL | Age: 23
End: 2023-05-15
Payer: MEDICAID

## 2023-05-15 DIAGNOSIS — R29.898 DECREASED STRENGTH INVOLVING KNEE JOINT: Primary | ICD-10-CM

## 2023-05-15 DIAGNOSIS — M25.661 DECREASED RANGE OF MOTION OF RIGHT KNEE: ICD-10-CM

## 2023-05-15 DIAGNOSIS — R26.9 GAIT ABNORMALITY: ICD-10-CM

## 2023-05-15 DIAGNOSIS — S80.911A UNSPECIFIED SUPERFICIAL INJURY OF RIGHT KNEE, INITIAL ENCOUNTER: ICD-10-CM

## 2023-05-15 PROCEDURE — 97110 THERAPEUTIC EXERCISES: CPT | Mod: PN

## 2023-05-15 NOTE — PROGRESS NOTES
[facility]   Physical Therapy Treatment Note     Name: Khloe BAIN [facility] Number: [id number]    Therapy Diagnosis:   Encounter Diagnoses   Name Primary?    Decreased strength involving knee joint Yes    Decreased range of motion of right knee     Unspecified superficial injury of right knee, initial encounter     Gait abnormality      Physician: Reno Jones MD    Visit Date: 5/15/2023  Physician Orders: PT Eval and Treat   Medical Diagnosis from Referral: Injury of right knee, initial encounter  Evaluation Date: 3/23/2023  Authorization Period Expiration: 2/27/2024  Plan of Care Expiration: 5/5/2023  Visit # / Visits authorized: 10/12  (POC 11/12)   FOTO Due visit 5 4/21/2023  FOTO Due visit 10      Precautions: Standard     Time In: 1450  Time Out: 1550  Total Billable Time: 60 minutes      SUBJECTIVE     Pt reports: posterior right knee pain, feels it is getting worse now wrapping around side of her leg.   She was compliant with home exercise program.  Response to previous treatment: no issues  Functional change: none today    Pain: 7/10  Location: right medial knee      OBJECTIVE     Objective Measures updated at progress report unless specified.     Treatment     Khloe received the treatments listed below:      therapeutic exercises to develop strength, ROM, and flexibility for 60 (medicaid) minutes including:      Straight leg raise with hip external rotation 1# 3 x 10  Side lying abduction 1# 3 x 10  Prone extension 3 x 10    Bridging 3 x 10  DKTC with Physioball 3 x 10 HS strengthening  Adductor squeeze with bridge 3 x 10    Hook lying hip abduction with bridge Green theraband 3 x 10       Standing Terminal Knee Extension with ball 3 x 10  HR/TR x 30 airex  Slant board Gastroc stretch 3x30 sec Bilateral   Physioball wall squats x 30  Isometric hip abduction with contralateral hip hike 3 x 10 Bilateral     Shuttle leg press x 30 50#  Shuttle calf press 3 x 10  25#    Cable column retro walks x 10 13#  Cable column lateral side steps x 10 10#  Cable column TKE 3 x 10 10#       Patient Education and Home Exercises     Home Exercises Provided and Patient Education Provided     Education provided:   - cont HOME EXERCISE PROGRAM   _ She should contact her MD office as she is almost done with Therapy and is not getting better    Written Home Exercises Provided: Patient instructed to cont prior HEP. Exercises were reviewed and Khloe was able to demonstrate them prior to the end of the session.  Khloe demonstrated good  understanding of the education provided. See EMR under Patient Instructions for exercises provided during therapy sessions    ASSESSMENT     Pain has continued Hamstring/posterior knee pain. Lacking strength. Antalgic gait pattern.       Khloe Is progressing well towards her goals.   Pt prognosis is Good.     Pt will continue to benefit from skilled outpatient physical therapy to address the deficits listed in the problem list box on initial evaluation, provide pt/family education and to maximize pt's level of independence in the home and community environment.     Pt's spiritual, cultural and educational needs considered and pt agreeable to plan of care and goals.     Anticipated barriers to physical therapy: none    Goals:     Short Term Goals (STG) # weeks Goal Review Date Reviewed Date Met   1. The patient will begin a written HEP 1 ongoing 5/15/2023       2. Increase knee ROM to 0* - 120* 3 ongoing 5/15/2023     3. Decrease soft tissue tenderness to moderate 3 ongoing 5/15/2023         4. Increase hamstring flexibility to 60* 3 ongoing 5/15/2023       5. Increase ankle dorsiflexion to 5* 3 ongoing 5/15/2023       6. Increase right knee strength to 4+/5 3 ongoing 5/15/2023          Long Term Goals (LTG) # weeks Goal Review Date Reviewed Date Met   1. The patient will be independent with a HEP for maintenance 6 ongoing 5/15/2023       2.  Increase knee ROM to 0* to 130* 6 ongoing 5/15/2023     3. Increase knee strength to 5/5 6 ongoing 5/15/2023     4. The patient will ambulate on a community level without gait deviation . 6 ongoing 5/15/2023       5. Decrease FOTO to < 34%. 6 ongoing 5/15/2023     6. The patient will be able to ascend/descend 20 step/stairs without onset of symptoms.  ongoing 5/15/2023          PLAN     Cont per Plan of Care, balance, Range of Motion, R LE strength    Cristhian Gerardo, PT

## 2023-05-17 ENCOUNTER — CLINICAL SUPPORT (OUTPATIENT)
Dept: REHABILITATION | Facility: HOSPITAL | Age: 23
End: 2023-05-17
Payer: MEDICAID

## 2023-05-17 DIAGNOSIS — S80.911A UNSPECIFIED SUPERFICIAL INJURY OF RIGHT KNEE, INITIAL ENCOUNTER: ICD-10-CM

## 2023-05-17 DIAGNOSIS — R26.9 GAIT ABNORMALITY: ICD-10-CM

## 2023-05-17 DIAGNOSIS — R29.898 DECREASED STRENGTH INVOLVING KNEE JOINT: Primary | ICD-10-CM

## 2023-05-17 DIAGNOSIS — M25.661 DECREASED RANGE OF MOTION OF RIGHT KNEE: ICD-10-CM

## 2023-05-17 PROCEDURE — 97110 THERAPEUTIC EXERCISES: CPT | Mod: PN | Performed by: PHYSICAL THERAPIST

## 2023-05-17 NOTE — PROGRESS NOTES
[facility]   Physical Therapy Treatment Note     Name: Khloe BAIN [facility] Number: [id number]    Therapy Diagnosis:   Encounter Diagnoses   Name Primary?    Decreased strength involving knee joint Yes    Decreased range of motion of right knee     Unspecified superficial injury of right knee, initial encounter     Gait abnormality        Physician: Reno Jones MD    Visit Date: 5/17/2023  Physician Orders: PT Eval and Treat   Medical Diagnosis from Referral: Injury of right knee, initial encounter  Evaluation Date: 3/23/2023  Authorization Period Expiration: 2/27/2024  Plan of Care Expiration: 5/5/2023  Visit # / Visits authorized: 10/12  (POC 11/12)   FOTO Due visit 5 4/21/2023  FOTO Due visit 10      Precautions: Standard     Time In: 1500  Time Out: 1600  Total Billable Time: 60 minutes      SUBJECTIVE     Pt reports: continued medial right knee pain.   She was compliant with home exercise program.  Response to previous treatment: no issues  Functional change: none today    Pain: 7/10  Location: right medial knee      OBJECTIVE     Objective Measures updated at progress report unless specified.     Treatment     Khloe received the treatments listed below:      therapeutic exercises to develop strength, ROM, and flexibility for 60 (medicaid) minutes including:      Straight leg raise with hip external rotation 1# 3 x 10  Side lying abduction 1# 3 x 10  Prone extension 3 x 10    Bridging 3 x 10  DKTC with Physioball 3 x 10 HS strengthening  Adductor squeeze with bridge 3 x 10    Hook lying hip abduction with bridge Green theraband 3 x 10       Standing Terminal Knee Extension with ball 3 x 10  HR/TR x 30 airex  Slant board Gastroc stretch 3x30 sec Bilateral   Physioball wall squats x 30  Isometric hip abduction with contralateral hip hike 3 x 10 Bilateral     Shuttle leg press x 30 50#  Shuttle calf press 3 x 10 25#    Cable column retro walks x 10 13#  Cable column lateral  side steps x 10 10#  Cable column TKE 3 x 10 10#       Patient Education and Home Exercises     Home Exercises Provided and Patient Education Provided     Education provided:   - cont HOME EXERCISE PROGRAM   _ She should contact her MD office as she is almost done with Therapy and is not getting better    Written Home Exercises Provided: Patient instructed to cont prior HEP. Exercises were reviewed and Khloe was able to demonstrate them prior to the end of the session.  Khloe demonstrated good  understanding of the education provided. See EMR under Patient Instructions for exercises provided during therapy sessions    ASSESSMENT     Patient with continued medial right knee pain. Increased pain with prolonged standing. Patient tolerated treatment well without report of adverse effects.      Khloe Is progressing well towards her goals.   Pt prognosis is Good.     Pt will continue to benefit from skilled outpatient physical therapy to address the deficits listed in the problem list box on initial evaluation, provide pt/family education and to maximize pt's level of independence in the home and community environment.     Pt's spiritual, cultural and educational needs considered and pt agreeable to plan of care and goals.     Anticipated barriers to physical therapy: none    Goals:     Short Term Goals (STG) # weeks Goal Review Date Reviewed Date Met   1. The patient will begin a written HEP 1 ongoing 5/17/2023       2. Increase knee ROM to 0* - 120* 3 ongoing 5/17/2023     3. Decrease soft tissue tenderness to moderate 3 ongoing 5/17/2023         4. Increase hamstring flexibility to 60* 3 ongoing 5/17/2023       5. Increase ankle dorsiflexion to 5* 3 ongoing 5/17/2023       6. Increase right knee strength to 4+/5 3 ongoing 5/17/2023          Long Term Goals (LTG) # weeks Goal Review Date Reviewed Date Met   1. The patient will be independent with a HEP for maintenance 6 ongoing 5/17/2023       2.  Increase knee ROM to 0* to 130* 6 ongoing 5/17/2023     3. Increase knee strength to 5/5 6 ongoing 5/17/2023     4. The patient will ambulate on a community level without gait deviation . 6 ongoing 5/17/2023       5. Decrease FOTO to < 34%. 6 ongoing 5/17/2023     6. The patient will be able to ascend/descend 20 step/stairs without onset of symptoms.  ongoing 5/17/2023          PLAN     Cont per Plan of Care, balance, Range of Motion, R LE strength    Conrado Medrano, PT

## 2023-05-22 ENCOUNTER — CLINICAL SUPPORT (OUTPATIENT)
Dept: REHABILITATION | Facility: HOSPITAL | Age: 23
End: 2023-05-22
Payer: MEDICAID

## 2023-05-22 DIAGNOSIS — M25.661 DECREASED RANGE OF MOTION OF RIGHT KNEE: ICD-10-CM

## 2023-05-22 DIAGNOSIS — R26.9 GAIT ABNORMALITY: ICD-10-CM

## 2023-05-22 DIAGNOSIS — R29.898 DECREASED STRENGTH INVOLVING KNEE JOINT: Primary | ICD-10-CM

## 2023-05-22 DIAGNOSIS — S80.911A UNSPECIFIED SUPERFICIAL INJURY OF RIGHT KNEE, INITIAL ENCOUNTER: ICD-10-CM

## 2023-05-22 PROCEDURE — 97110 THERAPEUTIC EXERCISES: CPT | Mod: PN,CQ

## 2023-05-22 NOTE — PROGRESS NOTES
[facility]   Physical Therapy Treatment Note     Name: Khloe BAIN [facility] Number: [id number]    Therapy Diagnosis:   Encounter Diagnoses   Name Primary?    Decreased strength involving knee joint Yes    Decreased range of motion of right knee     Unspecified superficial injury of right knee, initial encounter     Gait abnormality        Physician: Reno Jones MD    Visit Date: 5/22/2023  Physician Orders: PT Eval and Treat   Medical Diagnosis from Referral: Injury of right knee, initial encounter  Evaluation Date: 3/23/2023  Authorization Period Expiration: 2/27/2024  Plan of Care Expiration: 5/5/2023  Visit # / Visits authorized: 13/12  (POC 12/12)   FOTO Due visit 5 4/21/2023  FOTO Due visit 10      Precautions: Standard     Time In: 1500  Time Out: 1558  Total Billable Time: 30 minutes  Total Unbillable Time: 28 minutes      SUBJECTIVE     Pt reports: continued medial right knee pain.   She was compliant with home exercise program.  Response to previous treatment: no issues  Functional change: none today    Pain: 7/10  Location: right medial knee      OBJECTIVE     Objective Measures updated at progress report unless specified.     Treatment     Khloe received the treatments listed below:      therapeutic exercises to develop strength, ROM, and flexibility for  58 (medicaid) minutes including:      Straight leg raise with hip external rotation 1# 3 x 10  Side lying abduction 1# 3 x 10  Prone extension 3 x 10    Bridging 3 x 10  DKTC with Physioball 3 x 10 HS strengthening  Adductor squeeze with bridge 3 x 10    Hook lying hip abduction with bridge Green theraband 3 x 10       Standing Terminal Knee Extension with ball 3 x 10  Heel raises/Toe raises x 30 airex  Slant board Gastroc stretch 3x30 sec Bilateral   Physioball wall squats x 30  Isometric hip abduction with contralateral hip hike 3 x 10 Bilateral     Shuttle leg press x 30 50#  Shuttle calf press 3 x 10  25#    Cable column retro walks x 10 13#  Cable column lateral side steps x 10 10#  Cable column TKE 3 x 10 10#     Kinesio taping to R knee: horizontal strip at inferior patella, medial/lateral J strips, 65% tension, no tension on anchors    Patient Education and Home Exercises     Home Exercises Provided and Patient Education Provided     Education provided:   - cont HOME EXERCISE PROGRAM   _ She should contact her MD office as she is almost done with Therapy and is not getting better    Written Home Exercises Provided: Patient instructed to cont prior HEP. Exercises were reviewed and Khloe was able to demonstrate them prior to the end of the session.  Khloe demonstrated good  understanding of the education provided. See EMR under Patient Instructions for exercises provided during therapy sessions    ASSESSMENT     Patient provided good demonstration of therapeutic exercises without adverse reactions. Reviewed home exercises program with reports of good understanding. Instructed on correct application of Kinesio tape at home.  D/C per PT.      Khloe Is progressing well towards her goals.   Pt prognosis is Good.     Pt will continue to benefit from skilled outpatient physical therapy to address the deficits listed in the problem list box on initial evaluation, provide pt/family education and to maximize pt's level of independence in the home and community environment.     Pt's spiritual, cultural and educational needs considered and pt agreeable to plan of care and goals.     Anticipated barriers to physical therapy: none    Goals:     Short Term Goals (STG) # weeks Goal Review Date Reviewed Date Met   1. The patient will begin a written HEP 1 ongoing 5/22/2023       2. Increase knee ROM to 0* - 120* 3 ongoing 5/22/2023     3. Decrease soft tissue tenderness to moderate 3 ongoing 5/22/2023         4. Increase hamstring flexibility to 60* 3 ongoing 5/22/2023       5. Increase ankle dorsiflexion  to 5* 3 ongoing 5/22/2023       6. Increase right knee strength to 4+/5 3 ongoing 5/22/2023          Long Term Goals (LTG) # weeks Goal Review Date Reviewed Date Met   1. The patient will be independent with a HEP for maintenance 6 ongoing 5/22/2023       2. Increase knee ROM to 0* to 130* 6 ongoing 5/22/2023     3. Increase knee strength to 5/5 6 ongoing 5/22/2023     4. The patient will ambulate on a community level without gait deviation . 6 ongoing 5/22/2023       5. Decrease FOTO to < 34%. 6 ongoing 5/22/2023     6. The patient will be able to ascend/descend 20 step/stairs without onset of symptoms.  ongoing 5/22/2023          PLAN     D/C per PT    I certify that I was present in the room directing the student in service delivery and guiding them using my skilled judgment. As the co-signing therapist I have reviewed the students documentation and am responsible for the treatment, assessment, and plan.      MAGGI Sheehan / Mikala Yates, PTA

## 2023-08-05 ENCOUNTER — HOSPITAL ENCOUNTER (EMERGENCY)
Facility: HOSPITAL | Age: 23
Discharge: HOME | End: 2023-08-05
Attending: EMERGENCY MEDICINE
Payer: MEDICAID

## 2023-08-05 VITALS
BODY MASS INDEX: 22.71 KG/M2 | HEART RATE: 73 BPM | RESPIRATION RATE: 16 BRPM | OXYGEN SATURATION: 100 % | SYSTOLIC BLOOD PRESSURE: 99 MMHG | WEIGHT: 145 LBS | TEMPERATURE: 99 F | DIASTOLIC BLOOD PRESSURE: 67 MMHG

## 2023-08-05 DIAGNOSIS — N12 PYELONEPHRITIS: Primary | ICD-10-CM

## 2023-08-05 LAB
ALBUMIN SERPL BCP-MCNC: 4.2 G/DL (ref 3.5–5.2)
ALP SERPL-CCNC: 64 U/L (ref 55–135)
ALT SERPL W/O P-5'-P-CCNC: 12 U/L (ref 10–44)
ANION GAP SERPL CALC-SCNC: 4 MMOL/L (ref 8–16)
AST SERPL-CCNC: 16 U/L (ref 10–40)
B-HCG UR QL: NEGATIVE
BACTERIA #/AREA URNS HPF: NEGATIVE /HPF
BASOPHILS # BLD AUTO: 0.05 K/UL (ref 0–0.2)
BASOPHILS NFR BLD: 0.4 % (ref 0–1.9)
BILIRUB SERPL-MCNC: 0.9 MG/DL (ref 0.1–1)
BILIRUB UR QL STRIP: NEGATIVE
BUN SERPL-MCNC: 11 MG/DL (ref 6–20)
CALCIUM SERPL-MCNC: 8.6 MG/DL (ref 8.7–10.5)
CHLORIDE SERPL-SCNC: 113 MMOL/L (ref 95–110)
CLARITY UR: ABNORMAL
CO2 SERPL-SCNC: 24 MMOL/L (ref 23–29)
COLOR UR: YELLOW
CREAT SERPL-MCNC: 0.7 MG/DL (ref 0.5–1.4)
CTP QC/QA: YES
DIFFERENTIAL METHOD: ABNORMAL
EOSINOPHIL # BLD AUTO: 0.2 K/UL (ref 0–0.5)
EOSINOPHIL NFR BLD: 1.4 % (ref 0–8)
ERYTHROCYTE [DISTWIDTH] IN BLOOD BY AUTOMATED COUNT: 12.3 % (ref 11.5–14.5)
EST. GFR  (NO RACE VARIABLE): >60 ML/MIN/1.73 M^2
GLUCOSE SERPL-MCNC: 92 MG/DL (ref 70–110)
GLUCOSE UR QL STRIP: NEGATIVE
HCT VFR BLD AUTO: 39 % (ref 37–48.5)
HGB BLD-MCNC: 13.5 G/DL (ref 12–16)
HGB UR QL STRIP: ABNORMAL
HYALINE CASTS #/AREA URNS LPF: 8 /LPF
IMM GRANULOCYTES # BLD AUTO: 0.04 K/UL (ref 0–0.04)
IMM GRANULOCYTES NFR BLD AUTO: 0.3 % (ref 0–0.5)
KETONES UR QL STRIP: NEGATIVE
LEUKOCYTE ESTERASE UR QL STRIP: ABNORMAL
LYMPHOCYTES # BLD AUTO: 3.2 K/UL (ref 1–4.8)
LYMPHOCYTES NFR BLD: 24.4 % (ref 18–48)
MCH RBC QN AUTO: 31.3 PG (ref 27–31)
MCHC RBC AUTO-ENTMCNC: 34.6 G/DL (ref 32–36)
MCV RBC AUTO: 91 FL (ref 82–98)
MICROSCOPIC COMMENT: ABNORMAL
MONOCYTES # BLD AUTO: 0.8 K/UL (ref 0.3–1)
MONOCYTES NFR BLD: 6.1 % (ref 4–15)
NEUTROPHILS # BLD AUTO: 8.8 K/UL (ref 1.8–7.7)
NEUTROPHILS NFR BLD: 67.4 % (ref 38–73)
NITRITE UR QL STRIP: NEGATIVE
NRBC BLD-RTO: 0 /100 WBC
PH UR STRIP: 7 [PH] (ref 5–8)
PLATELET # BLD AUTO: 260 K/UL (ref 150–450)
PMV BLD AUTO: 10 FL (ref 9.2–12.9)
POTASSIUM SERPL-SCNC: 3.7 MMOL/L (ref 3.5–5.1)
PROT SERPL-MCNC: 6.9 G/DL (ref 6–8.4)
PROT UR QL STRIP: ABNORMAL
RBC # BLD AUTO: 4.31 M/UL (ref 4–5.4)
RBC #/AREA URNS HPF: 34 /HPF (ref 0–4)
SODIUM SERPL-SCNC: 141 MMOL/L (ref 136–145)
SP GR UR STRIP: 1.01 (ref 1–1.03)
SQUAMOUS #/AREA URNS HPF: 1 /HPF
URN SPEC COLLECT METH UR: ABNORMAL
UROBILINOGEN UR STRIP-ACNC: NEGATIVE EU/DL
WBC # BLD AUTO: 13.06 K/UL (ref 3.9–12.7)
WBC #/AREA URNS HPF: >100 /HPF (ref 0–5)

## 2023-08-05 PROCEDURE — 25000003 PHARM REV CODE 250: Performed by: EMERGENCY MEDICINE

## 2023-08-05 PROCEDURE — 96374 THER/PROPH/DIAG INJ IV PUSH: CPT

## 2023-08-05 PROCEDURE — 87086 URINE CULTURE/COLONY COUNT: CPT | Performed by: EMERGENCY MEDICINE

## 2023-08-05 PROCEDURE — 96361 HYDRATE IV INFUSION ADD-ON: CPT

## 2023-08-05 PROCEDURE — 81025 URINE PREGNANCY TEST: CPT | Performed by: EMERGENCY MEDICINE

## 2023-08-05 PROCEDURE — 85025 COMPLETE CBC W/AUTO DIFF WBC: CPT | Performed by: EMERGENCY MEDICINE

## 2023-08-05 PROCEDURE — 99284 EMERGENCY DEPT VISIT MOD MDM: CPT

## 2023-08-05 PROCEDURE — 96375 TX/PRO/DX INJ NEW DRUG ADDON: CPT

## 2023-08-05 PROCEDURE — 80053 COMPREHEN METABOLIC PANEL: CPT | Performed by: EMERGENCY MEDICINE

## 2023-08-05 PROCEDURE — 63600175 PHARM REV CODE 636 W HCPCS: Performed by: EMERGENCY MEDICINE

## 2023-08-05 PROCEDURE — 81001 URINALYSIS AUTO W/SCOPE: CPT | Performed by: EMERGENCY MEDICINE

## 2023-08-05 RX ORDER — CIPROFLOXACIN 500 MG/1
500 TABLET ORAL 2 TIMES DAILY
Qty: 14 TABLET | Refills: 0 | Status: SHIPPED | OUTPATIENT
Start: 2023-08-05 | End: 2023-08-12

## 2023-08-05 RX ORDER — KETOROLAC TROMETHAMINE 30 MG/ML
15 INJECTION, SOLUTION INTRAMUSCULAR; INTRAVENOUS
Status: COMPLETED | OUTPATIENT
Start: 2023-08-05 | End: 2023-08-05

## 2023-08-05 RX ADMIN — SODIUM CHLORIDE 1000 ML: 9 INJECTION, SOLUTION INTRAVENOUS at 01:08

## 2023-08-05 RX ADMIN — KETOROLAC TROMETHAMINE 15 MG: 30 INJECTION, SOLUTION INTRAMUSCULAR at 01:08

## 2023-08-05 RX ADMIN — DEXTROSE MONOHYDRATE 1 G: 50 INJECTION, SOLUTION INTRAVENOUS at 02:08

## 2023-08-05 NOTE — ED PROVIDER NOTES
Encounter Date: 2023       History     Chief Complaint   Patient presents with    Flank Pain     Right flank pain x 1 day, with frequent urination     Emergent evaluation of a 23-year-old female with history of urinary tract infections and pyelonephritis in the past presents to the ER after 3 days of urinary urgency and frequency she reports no dysuria and no abdominal pain she reports then today she woke up from a nap and was having right flank pain that feels similar to pyelonephritis in the past she reports no fevers chills or sweats no nausea or vomiting she reports no change in the pain when eating.  No diarrhea or constipation.  She feels mildly lightheaded and dizzy.  She reports no history of kidney stones.  She reports she still has her gallbladder      Review of patient's allergies indicates:  No Known Allergies  Past Medical History:   Diagnosis Date    Headache     Postpartum depression     concerned due to history      Past Surgical History:   Procedure Laterality Date    KNEE ARTHROSCOPY W/ ACL RECONSTRUCTION      KNEE ARTHROSCOPY W/ MENISCAL REPAIR      KNEE ARTHROSCOPY W/ PCL AND LCL  REPAIR & TENDON GRAFT      TONSILLECTOMY       Family History   Problem Relation Age of Onset    No Known Problems Mother     No Known Problems Father     No Known Problems Sister     No Known Problems Brother     No Known Problems Daughter     No Known Problems Son     No Known Problems Maternal Aunt     No Known Problems Maternal Uncle     No Known Problems Paternal Aunt     No Known Problems Paternal Uncle     No Known Problems Maternal Grandmother     No Known Problems Maternal Grandfather     No Known Problems Paternal Grandmother     No Known Problems Paternal Grandfather      Social History     Tobacco Use    Smoking status: Former     Current packs/day: 0.00     Types: Cigarettes     Quit date: 8/15/2020     Years since quittin.9    Smokeless tobacco: Never    Tobacco comments:     Quit when she found  out she was pregnant.   Substance Use Topics    Alcohol use: Not Currently    Drug use: Not Currently     Review of Systems   Constitutional:  Negative for activity change, appetite change, chills, diaphoresis, fatigue and fever.   HENT:  Negative for congestion, postnasal drip, rhinorrhea and sore throat.    Respiratory:  Negative for cough, chest tightness, shortness of breath, wheezing and stridor.    Cardiovascular:  Negative for chest pain and palpitations.   Gastrointestinal:  Negative for abdominal distention, abdominal pain, blood in stool, constipation, diarrhea, nausea and vomiting.   Genitourinary:  Positive for flank pain, frequency and urgency. Negative for difficulty urinating, dysuria, hematuria, menstrual problem, pelvic pain, vaginal bleeding, vaginal discharge and vaginal pain.   Musculoskeletal:  Negative for arthralgias, back pain, myalgias, neck pain and neck stiffness.   Skin:  Negative for rash.   Neurological:  Positive for dizziness and light-headedness. Negative for syncope, weakness and headaches.   Hematological:  Does not bruise/bleed easily.   Psychiatric/Behavioral:  Negative for confusion. The patient is not nervous/anxious.    All other systems reviewed and are negative.      Physical Exam     Initial Vitals [08/05/23 0049]   BP Pulse Resp Temp SpO2   105/77 93 18 98.5 °F (36.9 °C) 99 %      MAP       --         Physical Exam    Nursing note and vitals reviewed.  Constitutional: She appears well-developed and well-nourished. She is not diaphoretic. No distress.   HENT:   Head: Normocephalic and atraumatic.   Right Ear: External ear normal.   Left Ear: External ear normal.   Nose: Nose normal.   Mouth/Throat: Oropharynx is clear and moist.   Eyes: Conjunctivae and EOM are normal. Pupils are equal, round, and reactive to light.   Neck: Neck supple. No tracheal deviation present.   Normal range of motion.  Cardiovascular:  Normal rate, regular rhythm, normal heart sounds and intact  distal pulses.     Exam reveals no gallop and no friction rub.       No murmur heard.  Pulmonary/Chest: Breath sounds normal. No stridor. No respiratory distress. She has no wheezes. She has no rhonchi. She has no rales. She exhibits no tenderness.   Abdominal: Abdomen is soft and flat. Bowel sounds are normal. She exhibits no shifting dullness, no distension, no fluid wave, no pulsatile midline mass and no mass. There is no splenomegaly or hepatomegaly. There is abdominal tenderness in the right upper quadrant and right lower quadrant. No hernia.   There is right CVA tenderness.  No left CVA tenderness.     There is no rebound, no guarding, no tenderness at McBurney's point and negative Bradley's sign.   Musculoskeletal:         General: No edema. Normal range of motion.      Cervical back: Normal range of motion and neck supple.     Neurological: She is alert and oriented to person, place, and time. She has normal strength. No cranial nerve deficit or sensory deficit.   Skin: Skin is warm and dry. No rash noted. No erythema. No pallor.   Psychiatric: She has a normal mood and affect. Her behavior is normal. Judgment and thought content normal.         ED Course   Procedures  Labs Reviewed   URINALYSIS, REFLEX TO URINE CULTURE - Abnormal; Notable for the following components:       Result Value    Appearance, UA Hazy (*)     Protein, UA 1+ (*)     Occult Blood UA 2+ (*)     Leukocytes, UA 3+ (*)     All other components within normal limits    Narrative:     Specimen Source->Urine   CBC W/ AUTO DIFFERENTIAL - Abnormal; Notable for the following components:    WBC 13.06 (*)     MCH 31.3 (*)     Gran # (ANC) 8.8 (*)     All other components within normal limits   COMPREHENSIVE METABOLIC PANEL - Abnormal; Notable for the following components:    Chloride 113 (*)     Calcium 8.6 (*)     Anion Gap 4 (*)     All other components within normal limits   URINALYSIS MICROSCOPIC - Abnormal; Notable for the following  components:    RBC, UA 34 (*)     WBC, UA >100 (*)     Hyaline Casts, UA 8 (*)     All other components within normal limits    Narrative:     Specimen Source->Urine   CULTURE, URINE   POCT URINE PREGNANCY          Imaging Results    None          Medications   cefTRIAXone (ROCEPHIN) 1 g in dextrose 5 % 100 mL IVPB (ready to mix) (1 g Intravenous New Bag 8/5/23 0212)   sodium chloride 0.9% bolus 1,000 mL 1,000 mL (0 mLs Intravenous Stopped 8/5/23 0212)   ketorolac injection 15 mg (15 mg Intravenous Given 8/5/23 0115)     Medical Decision Making:   Clinical Tests:   Lab Tests: Ordered and Reviewed  The following lab test(s) were unremarkable: UPT       <> Summary of Lab: White count 13.06 ANC 8.8  CMP with a chloride 113 calcium 8.6 otherwise normal  Urine was hazy 1+ protein 2+ blood 3+ leukocytes more than 100 white blood cells 34 red blood cells no bacteria, 1 squamous cell  ED Management:  Emergent evaluation of a 23-year-old female with history of urinary tract infections and pyelonephritis in the past presents to the ER after 3 days of urinary urgency and frequency she reports no dysuria and no abdominal pain she reports then today she woke up from a nap and was having right flank pain that feels similar to pyelonephritis in the past she reports no fevers chills or sweats no nausea or vomiting she reports no change in the pain when eating.  No diarrhea or constipation.  She feels mildly lightheaded and dizzy.  She reports no history of kidney stones.  She reports she still has her gallbladder  On physical exam vital signs are normal patient is afebrile 98.5 normal pulse of 93 blood pressure 105/77 sats 99% on room air  Tenderness to right upper quadrant right lower quadrant and right flank.  No Bradley sign.  Soft abdomen without rebound or guarding.  No jaundice.  Normal cardiac and lung exam  MDM    Patient presents for emergent evaluation of acute 3 days urgency and frequency now with right flank pain dizzy  and lightheaded tonight that poses a threat to life and/or bodily function.   Differential diagnosis includes but was not limited to acute pancreatitis, acute cholecystitis and cholangitis, colitis, acute appendicitis, urinary tract infection, ovarian torsion,  PID, TOA,  pyelonephritis, kidney stone, volvulus, small bowel obstruction, enteritis, gastritis, colitis, mesenteric ischemia, AAA, AAA rupture, aortic dissection, constipation, upper or lower gi bleeding, traumatic injury.   .   In the ED patient found to have acute pyelonephritis with right flank pain  I ordered labs and personally reviewed them.  Labs significant for UPT negative       Discharge MDM     Patient was managed in the ED with IV 1 L normal saline 15 mg of Toradol patient be given 1 g Rocephin IV discharged home with ciprofloxacin for 7 days follow up with primary care physician to ensure resolution of symptoms  The response to treatment was good.    Patient was discharged in stable condition.  Detailed return precautions discussed.  Patient was told to follow up with primary care physician or specialist based on their diagnosis  Julianne La MD                            Clinical Impression:   Final diagnoses:  [N12] Pyelonephritis (Primary)               Julianne La MD  08/05/23 0234

## 2023-08-08 LAB — BACTERIA UR CULT: NO GROWTH

## 2023-10-06 DIAGNOSIS — S83.511A SPRAIN OF ANTERIOR CRUCIATE LIGAMENT OF RIGHT KNEE: Primary | ICD-10-CM

## 2023-10-20 ENCOUNTER — CLINICAL SUPPORT (OUTPATIENT)
Dept: REHABILITATION | Facility: HOSPITAL | Age: 23
End: 2023-10-20
Payer: MEDICAID

## 2023-10-20 DIAGNOSIS — M25.661 DECREASED RANGE OF MOTION (ROM) OF RIGHT KNEE: ICD-10-CM

## 2023-10-20 DIAGNOSIS — M62.81 QUADRICEPS WEAKNESS: ICD-10-CM

## 2023-10-20 PROCEDURE — 97161 PT EVAL LOW COMPLEX 20 MIN: CPT | Mod: PN

## 2023-10-22 PROBLEM — M62.81 QUADRICEPS WEAKNESS: Status: ACTIVE | Noted: 2023-10-22

## 2023-10-22 PROBLEM — M25.661 DECREASED RANGE OF MOTION (ROM) OF RIGHT KNEE: Status: ACTIVE | Noted: 2023-10-22

## 2023-10-22 NOTE — PLAN OF CARE
[facility] OUTPATIENT THERAPY AND WELLNESS   Physical Therapy Initial Evaluation      Name: Khloe BAIN [facility] Number: [id number]    Therapy Diagnosis:   Encounter Diagnoses   Name Primary?    Decreased range of motion (ROM) of right knee     Quadriceps weakness         Physician: Cosme Jim IV, MD    Physician Orders: PT Eval and Treat   Medical Diagnosis from Referral: S83.511A (ICD-10-CM) - Sprain of anterior cruciate ligament of right knee  Evaluation Date: 10/20/2023  Authorization Period Expiration: 10/05/2024  Plan of Care Expiration: 12/15/2023  Progress Note Due: 11/19/2023  Date of Surgery: 09/29/2023  Visit # / Visits authorized: 1/ 1   FOTO: 1/ 3    Precautions: Standard     Time In: 2:30  Time Out: 3:00  Total Billable Time: 30 minutes    Subjective     Date of onset: 3 weeks ago was surgery    History of current condition - Khloe reports: She had to have the repair because she had a fall and reinjured her knee.     Falls: None since initial injury    Imaging: CT scan films:   FINDINGS:     BONES: Expected femoral and tibial changes associated with ACL reconstruction. The femoral button and the tibial anchor appear uncomplicated.     JOINTS: No articular abnormality.     SOFT TISSUES: Unremarkable.    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; raising canes  Prior Level of Function: independent with all task   Current Level of Function: limited with walking and ADLs    Pain:  Current 0/10, worst 7/10, best 0/10   Location: right knee    Description: Aching and stretching  Aggravating Factors: bending the knee   Easing Factors: ice and keeping the knee straight    Patients goals: be able to walk and move it normally; have a normal functioning leg      Medical History:   Past Medical History:   Diagnosis Date    Headache     Postpartum depression     concerned due to history        Surgical History:   Khloe BAIN  Elder  has a past surgical history that includes Tonsillectomy; Knee arthroscopy w/ ACL reconstruction; Knee arthroscopy w/ meniscal repair; and Knee arthroscopy w/ PCL & LCL repair & tendon graft.    Medications:   Khloe has a current medication list which includes the following prescription(s): benzocaine-lanolin, butalbital-acetaminophen-caffeine -40 mg, cetirizine, diclofenac, estradiol cypionate, fluticasone propionate, ibuprofen, lanolin, medroxyprogesterone, and ondansetron.    Allergies:   Review of patient's allergies indicates:  No Known Allergies     Objective      Observation: presents to therapy with knee brace and ace wrap around knee     Functional Tests:  Gait: Ambulating with crutches    Knee Passive Range of Motion:    Comments   Left  -3/0/140 degrees    Right  -3/0/40 degrees Pain with knee flexion       Ankle Passive Range of Motion:   Right  Left    Dorsiflexion 18 degrees  18 degrees   Plantarflexion 40 degrees 40 degrees   1st MTP Extension 20 degrees 20 degrees       Quad Set: Moderate activation; not able to hold contraction but fasciculations noted     Joint Mobility: limited patellar mobility noted in superior to inferior glide and lateral to medial glide     Palpation: No tenderness to palpation noted    Sensation: Decreased sensation noted around incision portals    Edema: Swelling noted around patella, no formal measurements taken.             Treatment     Total Treatment time (time-based codes) separate from Evaluation: 15 minutes     Khloe received the treatments listed below:      therapeutic activities to improve functional performance for 15  minutes, including:  POC education  HEP education:  Heel prop x2 minutes  Quad set  x30, 3 second hold  Straight leg raise x10  Heel slides (educated on, not completed)  Seated AAROM knee flexion using no involved leg x10    Patient Education and Home Exercises     Education provided:   - HEP education  - POC  education    Written Home Exercises Provided: yes. Exercises were reviewed and Khloe was able to demonstrate them prior to the end of the session.  Khloe demonstrated good  understanding of the education provided. See EMR under Patient Instructions for exercises provided during therapy sessions.    Assessment     Khloe is a 23 y.o. female referred to outpatient Physical Therapy with a medical diagnosis of S83.511A (ICD-10-CM) - Sprain of anterior cruciate ligament of right knee. Patient presents 3 weeks post Right knee scope with ACL and meniscus repair. Sara presents with decreased knee range of motion and strength. He has good knee extension at this time and was wearing a knee brace locked into extension upon arrival. She has moderate quadriceps activation and is able to complete 10 straight leg raises with no knee lag, but increased shaking. Her biggest limitation at this time is knee flexion; this is because the first time she has bent her knee in 3 weeks was today in therapy. The above listed deficits are limiting Sara from completed her ADLs, self care, work related task and participating in her leisurely activities. Sara stated that she wants to return to walking and doing her daily task and has no true goals of returning to running. At this time she makes a good candidate for skilled Physical Therapy to improve the above listed deficits.     Patient prognosis is Good.   Patient will benefit from skilled outpatient Physical Therapy to address the deficits stated above and in the chart below, provide patient /family education, and to maximize patientt's level of independence.     Plan of care discussed with patient: Yes  Patient's spiritual, cultural and educational needs considered and patient is agreeable to the plan of care and goals as stated below:     Anticipated Barriers for therapy: none noted at this time    Medical Necessity is demonstrated by the  following  History  Co-morbidities and personal factors that may impact the plan of care [x] LOW: no personal factors / co-morbidities  [] MODERATE: 1-2 personal factors / co-morbidities  [] HIGH: 3+ personal factors / co-morbidities    Moderate / High Support Documentation:   Co-morbidities affecting plan of care:     Personal Factors:   no deficits     Examination  Body Structures and Functions, activity limitations and participation restrictions that may impact the plan of care [x] LOW: addressing 1-2 elements  [] MODERATE: 3+ elements  [] HIGH: 4+ elements (please support below)    Moderate / High Support Documentation:      Clinical Presentation [x] LOW: stable  [] MODERATE: Evolving  [] HIGH: Unstable     Decision Making/ Complexity Score: low       Goals:  Short Term Goals: 4 weeks. Pt agrees with goals set.  Pt will demonstrate independence and compliance with initial HEP to improve independence and symptom management.   Pt will report Right knee pain </= 2/10 on exertion with knee flexion to demonstrate improved condition and ability to sit comfortably, ambulate and complete transitional movements.    Pt will complete 30 straight leg raises without knee lag to display increased motor control, quad strength and ability to ambulate.  Pt will improve Right knee flexion ROM by >= 50 % to improve tolerance for sitting and ambulation.      Long Term Goals: 8 weeks. Pt agrees with goals set.   Pt will demonstrate independence and compliance with final HEP to continue managing symptoms and developing mobility, motor control and strength.    Pt will improve FOTO score to </= 35% limited to demonstrate improved functional mobility.    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    Pt will be able to complete 30 double leg squats with symmetrical loading of her legs, no  knee valgus and no pain to display improved strength and ability to return to her PLOF.  Pt will improve Right knee ROM = to uninvolved side to improve tolerance for ambulation, sitting and squatting to improve ability to return to PLOF.    Pt goal: be able to walk and move it normally; have a normal functioning leg      Plan     Plan of care Certification: 10/20/2023 to 12/15/2023.    Outpatient Physical Therapy 2 times weekly for 8 weeks to include the following interventions: Electrical Stimulation NMES, Gait Training, Manual Therapy, Moist Heat/ Ice, Neuromuscular Re-ed, Patient Education, Self Care, Therapeutic Activities, and Therapeutic Exercise.     Milad Zuluaga, PT, DPT        Physician's Signature: _________________________________________ Date: ________________

## 2023-10-24 ENCOUNTER — CLINICAL SUPPORT (OUTPATIENT)
Dept: REHABILITATION | Facility: HOSPITAL | Age: 23
End: 2023-10-24
Payer: MEDICAID

## 2023-10-24 DIAGNOSIS — M25.661 DECREASED RANGE OF MOTION (ROM) OF RIGHT KNEE: Primary | ICD-10-CM

## 2023-10-24 DIAGNOSIS — M62.81 QUADRICEPS WEAKNESS: ICD-10-CM

## 2023-10-24 PROCEDURE — 97110 THERAPEUTIC EXERCISES: CPT | Mod: PN

## 2023-10-24 NOTE — PROGRESS NOTES
[facility]   Physical Therapy Treatment Note     Name: Khloe BAIN [facility] Number: [id number]    Therapy Diagnosis:   Encounter Diagnoses   Name Primary?    Decreased range of motion (ROM) of right knee Yes    Quadriceps weakness      Physician: Cosme Jim IV, MD    Visit Date: 10/24/2023    Physician Orders: PT Eval and Treat   Medical Diagnosis from Referral: S83.511A (ICD-10-CM) - Sprain of anterior cruciate ligament of right knee  Evaluation Date: 10/20/2023  Authorization Period Expiration: 12/31/2023  Plan of Care Expiration: 12/15/2023  Progress Note Due: 11/19/2023  Date of Surgery: 09/29/2023  Visit # / Visits authorized: 1/ 20 (+eval)  FOTO: 1/ 3     Precautions: Standard     PTA Visit #: 0/5     FOTO first follow up:   FOTO second follow up:     Time In: 8:55  Time Out: 9:48  Total Billable Time: 53 minutes    SUBJECTIVE     Pt reports: She is doing a little better, she got her stitches out, she is allowed to WBAT and her brace is not locked in extension anymore.  She was compliant with home exercise program.  Response to previous treatment: improved knee flexion, less pain with flexion  Functional change: as above    Pain: 0/10 at rest, 8/10 with flexion  Location: right knee      OBJECTIVE     Objective Measures updated at progress report unless specified.     Right knee range of motion: -3/0/60    Quad set: moderate activation    Straight leg raise: cannot perform without knee lag; no pain noted    Treatment     Khloe received the treatments listed below:    Patient was 1:1 for entire session (extender assisted with treatment)    therapeutic exercises to develop strength, endurance, ROM, and flexibility for 10 minutes including:  Assessment as seen above  Heel prop x 5 minutes   Heel slides x15 with Physical Therapist over pressure     manual therapy techniques: Joint mobilizations and Soft tissue Mobilization were applied to the: Right knee for 10 minutes,  including:  Gentle patellar mobilization in all planes (grade I-II)  Tibial AP grade I  Inferior patellar glide with knee flexion grade I    neuromuscular re-education activities to improve: Balance, Coordination, Kinesthetic, Sense, and Proprioception for 33 minutes. The following activities were included:  NMES level 32: 1.5 ramp, 5 second hold:  Quad set 5 minute  Modified straight leg raise 5 minute   Self Assisted LAQ 5 minute       Patient Education and Home Exercises     Home Exercises Provided and Patient Education Provided     Education provided:   - HEP education  - POC education    Written Home Exercises Provided: Patient instructed to cont prior HEP. Exercises were reviewed and Khloe was able to demonstrate them prior to the end of the session.  Khloe demonstrated good  understanding of the education provided. See EMR under Patient Instructions for exercises provided during therapy sessions    ASSESSMENT     Sara completed her first visit following evaluation with no complications. Therapy focused on improving her quadriceps motor control and stability. She completed all strengthening with no deficits and has full knee extension at this time. Her biggest deficit at this time is knee flexion; She was educated on following her heel slides and self assisted knee flexion to tolerance. Therapy will progress as necessary.     Khloe Is progressing well towards her goals.   Pt prognosis is Good.     Pt will continue to benefit from skilled outpatient physical therapy to address the deficits listed in the problem list box on initial evaluation, provide pt/family education and to maximize pt's level of independence in the home and community environment.     Pt's spiritual, cultural and educational needs considered and pt agreeable to plan of care and goals.     Anticipated barriers to physical therapy: none    Goals:  Short Term Goals: 4 weeks. Pt agrees with goals set.  Pt will demonstrate  independence and compliance with initial HEP to improve independence and symptom management.   Pt will report Right knee pain </= 2/10 on exertion with knee flexion to demonstrate improved condition and ability to sit comfortably, ambulate and complete transitional movements.    Pt will complete 30 straight leg raises without knee lag to display increased motor control, quad strength and ability to ambulate.  Pt will improve Right knee flexion ROM by >= 50 % to improve tolerance for sitting and ambulation.       Long Term Goals: 8 weeks. Pt agrees with goals set.   Pt will demonstrate independence and compliance with final HEP to continue managing symptoms and developing mobility, motor control and strength.    Pt will improve FOTO score to </= 35% limited to demonstrate improved functional mobility.    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    Pt will be able to complete 30 double leg squats with symmetrical loading of her legs, no knee valgus and no pain to display improved strength and ability to return to her PLOF.  Pt will improve Right knee ROM = to uninvolved side to improve tolerance for ambulation, sitting and squatting to improve ability to return to PLOF.    Pt goal: be able to walk and move it normally; have a normal functioning leg        PLAN     Develop knee range of motion and quadriceps strength; develop hip strength as tolerated.     Milad Zuluaga, PT, DPT

## 2023-10-27 ENCOUNTER — CLINICAL SUPPORT (OUTPATIENT)
Dept: REHABILITATION | Facility: HOSPITAL | Age: 23
End: 2023-10-27
Payer: MEDICAID

## 2023-10-27 DIAGNOSIS — M62.81 QUADRICEPS WEAKNESS: ICD-10-CM

## 2023-10-27 DIAGNOSIS — M25.661 DECREASED RANGE OF MOTION (ROM) OF RIGHT KNEE: Primary | ICD-10-CM

## 2023-10-27 PROCEDURE — 97110 THERAPEUTIC EXERCISES: CPT | Mod: PN

## 2023-10-27 NOTE — PROGRESS NOTES
[facility]   Physical Therapy Treatment Note     Name: Khloe BAIN [facility] Number: [id number]    Therapy Diagnosis:   Encounter Diagnoses   Name Primary?    Decreased range of motion (ROM) of right knee Yes    Quadriceps weakness      Physician: Cosme Jim IV, MD    Visit Date: 10/27/2023    Physician Orders: PT Eval and Treat   Medical Diagnosis from Referral: S83.511A (ICD-10-CM) - Sprain of anterior cruciate ligament of right knee  Evaluation Date: 10/20/2023  Authorization Period Expiration: 12/31/2023  Plan of Care Expiration: 12/15/2023  Progress Note Due: 11/19/2023  Date of Surgery: 09/29/2023  Visit # / Visits authorized: 1/ 20 (+eval)  FOTO: 1/ 3     Precautions: Standard     PTA Visit #: 0/5     FOTO first follow up:   FOTO second follow up:     Time In: 11:00  Time Out: 12:00  Total Billable Time: 60 minutes    SUBJECTIVE     Pt reports: She is doing good, she has been working on bending her knee, she is not quite able to do heel slides yet  She was compliant with home exercise program.  Response to previous treatment: improved knee flexion, less pain with flexion  Functional change: as above    Pain: 0/10 at rest, 8/10 with flexion  Location: right knee      OBJECTIVE     Objective Measures updated at progress report unless specified.     Right knee range of motion: -3/0/70    Quad set: moderate activation    Straight leg raise: can perform without knee lag; no pain noted    Treatment     Khloe received the treatments listed below:    Patient was 1:1 for entire session (extender assisted with treatment)    therapeutic exercises to develop strength, endurance, ROM, and flexibility for 23 minutes including:  Assessment as seen above  Heel prop x 5 minutes   Heel slides 3 x12 with green strap over pressure   Mini squat at head of table 3 x 10    manual therapy techniques: Joint mobilizations and Soft tissue Mobilization were applied to the: Right knee for 10  minutes, including:  Gentle patellar mobilization in all planes (grade I-III)  Tibial AP grade I-III  Inferior patellar glide with knee flexion     neuromuscular re-education activities to improve: Balance, Coordination, Kinesthetic, Sense, and Proprioception for 27 minutes. The following activities were included:  NMES level 38: 1.5 ramp, 5 second hold:  Quad set 5 minute  straight leg raise 5 minute   Self Assisted LAQ 5 minute     TKE red theraband 3 x 12         Patient Education and Home Exercises     Home Exercises Provided and Patient Education Provided     Education provided:   - HEP education  - POC education    Written Home Exercises Provided: Patient instructed to cont prior HEP. Exercises were reviewed and Khloe was able to demonstrate them prior to the end of the session.  Khloe demonstrated good  understanding of the education provided. See EMR under Patient Instructions for exercises provided during therapy sessions    ASSESSMENT     Sara is doing well at this time; she has improved quad activation. She can complete a straight leg raise with no knee lag, but increased shaking. Therapy progressed some strengthening and incorporated more knee flexion exercises. She is progressing well and therapy will look to advance as tolerated.     Khloe Is progressing well towards her goals.   Pt prognosis is Good.     Pt will continue to benefit from skilled outpatient physical therapy to address the deficits listed in the problem list box on initial evaluation, provide pt/family education and to maximize pt's level of independence in the home and community environment.     Pt's spiritual, cultural and educational needs considered and pt agreeable to plan of care and goals.     Anticipated barriers to physical therapy: none    Goals:  Short Term Goals: 4 weeks. Pt agrees with goals set.  Pt will demonstrate independence and compliance with initial HEP to improve independence and symptom  management.   Pt will report Right knee pain </= 2/10 on exertion with knee flexion to demonstrate improved condition and ability to sit comfortably, ambulate and complete transitional movements.    Pt will complete 30 straight leg raises without knee lag to display increased motor control, quad strength and ability to ambulate.  Pt will improve Right knee flexion ROM by >= 50 % to improve tolerance for sitting and ambulation.       Long Term Goals: 8 weeks. Pt agrees with goals set.   Pt will demonstrate independence and compliance with final HEP to continue managing symptoms and developing mobility, motor control and strength.    Pt will improve FOTO score to </= 35% limited to demonstrate improved functional mobility.    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    Pt will be able to complete 30 double leg squats with symmetrical loading of her legs, no knee valgus and no pain to display improved strength and ability to return to her PLOF.  Pt will improve Right knee ROM = to uninvolved side to improve tolerance for ambulation, sitting and squatting to improve ability to return to PLOF.    Pt goal: be able to walk and move it normally; have a normal functioning leg        PLAN     Develop knee range of motion and quadriceps strength; develop hip strength as tolerated.     Milad Zuluaga, PT, DPT

## 2023-10-30 ENCOUNTER — CLINICAL SUPPORT (OUTPATIENT)
Dept: REHABILITATION | Facility: HOSPITAL | Age: 23
End: 2023-10-30
Payer: MEDICAID

## 2023-10-30 DIAGNOSIS — M25.661 DECREASED RANGE OF MOTION (ROM) OF RIGHT KNEE: Primary | ICD-10-CM

## 2023-10-30 DIAGNOSIS — M62.81 QUADRICEPS WEAKNESS: ICD-10-CM

## 2023-10-30 PROCEDURE — 97110 THERAPEUTIC EXERCISES: CPT | Mod: PN,CQ

## 2023-10-30 NOTE — PROGRESS NOTES
[facility]   Physical Therapy Treatment Note     Name: Khloe BAIN [facility] Number: [id number]    Therapy Diagnosis:   Encounter Diagnoses   Name Primary?    Decreased range of motion (ROM) of right knee Yes    Quadriceps weakness      Physician: Cosme Jim IV, MD    Visit Date: 10/30/2023    Physician Orders: PT Eval and Treat   Medical Diagnosis from Referral: S83.511A (ICD-10-CM) - Sprain of anterior cruciate ligament of right knee  Evaluation Date: 10/20/2023  Authorization Period Expiration: 12/31/2023  Plan of Care Expiration: 12/15/2023  Progress Note Due: 11/19/2023  Date of Surgery: 09/29/2023  Visit # / Visits authorized: 3/ 20 (+eval)  FOTO: 1/ 3     Precautions: Standard     PTA Visit #: 1/5     FOTO first follow up:   FOTO second follow up:     Time In: 0830  Time Out: 0930  Total Billable Time: 60 minutes    SUBJECTIVE     Pt reports: She over did it on the weekend took her daughter to a bunch of trunk or treats over the weekend.     She was compliant with home exercise program.  Response to previous treatment: improved knee flexion, less pain with flexion  Functional change: as above    Pain: 0/10 at rest, 8/10 with flexion  Location: right knee      OBJECTIVE     Objective Measures updated at progress report unless specified.     Right knee range of motion: -3/0/70    Quad set: moderate activation    Straight leg raise: can perform without knee lag; no pain noted    Treatment     Post Op date 9/29/23: Sara is  4 weeks and 3 days  post op.     Khloe received the treatments listed below:    therapeutic exercises to develop strength, endurance, ROM, and flexibility for 23 minutes including:  Assessment as seen above  Heel prop x 5 minutes   Heel slides 3 x12 with green strap over pressure   Mini squat at head of table 3 x 10    manual therapy techniques: Joint mobilizations and Soft tissue Mobilization were applied to the: Right knee for 10 minutes,  including:  Gentle patellar mobilization in all planes (grade I-III)  Tibial AP grade I-III  Inferior patellar glide with knee flexion     neuromuscular re-education activities to improve: Balance, Coordination, Kinesthetic, Sense, and Proprioception for 27 minutes. The following activities were included:  NMES level 38: 1.5 ramp, 5 second hold:  Quad set 5 minute  straight leg raise 5 minute   Self Assisted LAQ 5 minute   TKE red theraband 3 x 12   Quad isometrics at 60 degrees flexion 5 seconds holds x 5 minutes       Patient Education and Home Exercises     Home Exercises Provided and Patient Education Provided     Education provided:   - HEP education  - POC education    Written Home Exercises Provided: Patient instructed to cont prior HEP. Exercises were reviewed and Khloe was able to demonstrate them prior to the end of the session.  Khloe demonstrated good  understanding of the education provided. See EMR under Patient Instructions for exercises provided during therapy sessions    ASSESSMENT     Sara was able to incorporate quad isometrics at 60 degrees of knee flexion well without issues. She continues to present with significant knee flexion limitations. Sara will continue to benefit from skilled Physical Therapy to maximize range of motion and strength as per her protocol.     Khloe Is progressing well towards her goals.   Pt prognosis is Good.     Pt will continue to benefit from skilled outpatient physical therapy to address the deficits listed in the problem list box on initial evaluation, provide pt/family education and to maximize pt's level of independence in the home and community environment.     Pt's spiritual, cultural and educational needs considered and pt agreeable to plan of care and goals.     Anticipated barriers to physical therapy: none    Goals:  Short Term Goals: 4 weeks. Pt agrees with goals set.  Pt will demonstrate independence and compliance with initial HEP  to improve independence and symptom management.   Pt will report Right knee pain </= 2/10 on exertion with knee flexion to demonstrate improved condition and ability to sit comfortably, ambulate and complete transitional movements.    Pt will complete 30 straight leg raises without knee lag to display increased motor control, quad strength and ability to ambulate.  Pt will improve Right knee flexion ROM by >= 50 % to improve tolerance for sitting and ambulation.       Long Term Goals: 8 weeks. Pt agrees with goals set.   Pt will demonstrate independence and compliance with final HEP to continue managing symptoms and developing mobility, motor control and strength.    Pt will improve FOTO score to </= 35% limited to demonstrate improved functional mobility.    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    Pt will be able to complete 30 double leg squats with symmetrical loading of her legs, no knee valgus and no pain to display improved strength and ability to return to her PLOF.  Pt will improve Right knee ROM = to uninvolved side to improve tolerance for ambulation, sitting and squatting to improve ability to return to PLOF.    Pt goal: be able to walk and move it normally; have a normal functioning leg        PLAN     Develop knee range of motion and quadriceps strength; develop hip strength as tolerated.     Margie Grubbs, PTA

## 2023-11-06 ENCOUNTER — CLINICAL SUPPORT (OUTPATIENT)
Dept: REHABILITATION | Facility: HOSPITAL | Age: 23
End: 2023-11-06
Payer: MEDICAID

## 2023-11-06 DIAGNOSIS — M25.661 DECREASED RANGE OF MOTION (ROM) OF RIGHT KNEE: Primary | ICD-10-CM

## 2023-11-06 DIAGNOSIS — M62.81 QUADRICEPS WEAKNESS: ICD-10-CM

## 2023-11-06 PROCEDURE — 97110 THERAPEUTIC EXERCISES: CPT | Mod: PN

## 2023-11-06 NOTE — PROGRESS NOTES
[facility]   Physical Therapy Treatment Note     Name: Khloe BAIN [facility] Number: [id number]    Therapy Diagnosis:   Encounter Diagnoses   Name Primary?    Decreased range of motion (ROM) of right knee Yes    Quadriceps weakness      Physician: Cosme Jim IV, MD    Visit Date: 11/6/2023    Physician Orders: PT Eval and Treat   Medical Diagnosis from Referral: S83.511A (ICD-10-CM) - Sprain of anterior cruciate ligament of right knee  Evaluation Date: 10/20/2023  Authorization Period Expiration: 12/31/2023  Plan of Care Expiration: 12/15/2023  Progress Note Due: 11/19/2023  Date of Surgery: 09/29/2023  Visit # / Visits authorized: 4/ 20 (+eval)  FOTO: 1/ 3     Precautions: Standard     PTA Visit #: 0/5     FOTO first follow up:   FOTO second follow up:     Time In: 0900  Time Out: 1000  Total Billable Time: 60 minutes    SUBJECTIVE     Pt reports: She is still having trouble bending her knee, but no problems getting off the toilet or doing any ADLs.     She was compliant with home exercise program.  Response to previous treatment: improved knee flexion, less pain with flexion  Functional change: as above    Pain: 0/10 at rest, 8/10 with flexion  Location: right knee      OBJECTIVE     Objective Measures updated at progress report unless specified.     Right knee range of motion: -3/0/95    Quad set: moderate activation    Straight leg raise: can perform without knee lag; no pain noted    Treatment     Post Op date 9/29/23: Sara is  5 weeks and 3 days  post op. As of 11/06/2023    Patient was 1:1 for entire session    Khloe received the treatments listed below:    therapeutic exercises to develop strength, endurance, ROM, and flexibility for 23 minutes including:  Assessment as seen above  Heel prop x 5 minutes   Heel slides 3 x12 with 3# weight for overpressure, 3 second hold   Mini squat at head of table 3 x 10    manual therapy techniques: Joint mobilizations and Soft  tissue Mobilization were applied to the: Right knee for 13 minutes, including:  patellar mobilization in all planes (grade III)  Tibial AP grade III-IV  Inferior patellar glide with knee flexion   Muscle roller to Right quad for 3 minutes     neuromuscular re-education activities to improve: Balance, Coordination, Kinesthetic, Sense, and Proprioception for 24 minutes. The following activities were included:  NMES level 38: 1.5 ramp, 5 second hold:  Quad set 5 minute  Supine straight leg raise 5 minute   TKE green theraband 5 minutes      Patient Education and Home Exercises     Home Exercises Provided and Patient Education Provided     Education provided:   - HEP education  - POC education    Written Home Exercises Provided: Patient instructed to cont prior HEP. Exercises were reviewed and Khloe was able to demonstrate them prior to the end of the session.  Khloe demonstrated good  understanding of the education provided. See EMR under Patient Instructions for exercises provided during therapy sessions    ASSESSMENT     Sara completed therapy with no complications. It is noted that her quadriceps have increased tone and are limiting her knee flexion; she had improved Knee flexion following soft tissue work and manual therapy. Therapy will look to continue developing her quad strength as necessary.     Khloe Is progressing well towards her goals.   Pt prognosis is Good.     Pt will continue to benefit from skilled outpatient physical therapy to address the deficits listed in the problem list box on initial evaluation, provide pt/family education and to maximize pt's level of independence in the home and community environment.     Pt's spiritual, cultural and educational needs considered and pt agreeable to plan of care and goals.     Anticipated barriers to physical therapy: none    Goals:  Short Term Goals: 4 weeks. Pt agrees with goals set.  Pt will demonstrate independence and compliance with  initial HEP to improve independence and symptom management.   Pt will report Right knee pain </= 2/10 on exertion with knee flexion to demonstrate improved condition and ability to sit comfortably, ambulate and complete transitional movements.    Pt will complete 30 straight leg raises without knee lag to display increased motor control, quad strength and ability to ambulate.  Pt will improve Right knee flexion ROM by >= 50 % to improve tolerance for sitting and ambulation.       Long Term Goals: 8 weeks. Pt agrees with goals set.   Pt will demonstrate independence and compliance with final HEP to continue managing symptoms and developing mobility, motor control and strength.    Pt will improve FOTO score to </= 35% limited to demonstrate improved functional mobility.    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    Pt will be able to complete 30 double leg squats with symmetrical loading of her legs, no knee valgus and no pain to display improved strength and ability to return to her PLOF.  Pt will improve Right knee ROM = to uninvolved side to improve tolerance for ambulation, sitting and squatting to improve ability to return to PLOF.    Pt goal: be able to walk and move it normally; have a normal functioning leg        PLAN     Develop knee range of motion and quadriceps strength; develop hip strength as tolerated.     Milad Zuluaga, PT

## 2023-11-13 ENCOUNTER — CLINICAL SUPPORT (OUTPATIENT)
Dept: REHABILITATION | Facility: HOSPITAL | Age: 23
End: 2023-11-13
Payer: MEDICAID

## 2023-11-13 DIAGNOSIS — M62.81 QUADRICEPS WEAKNESS: ICD-10-CM

## 2023-11-13 DIAGNOSIS — M25.661 DECREASED RANGE OF MOTION (ROM) OF RIGHT KNEE: Primary | ICD-10-CM

## 2023-11-13 PROCEDURE — 97110 THERAPEUTIC EXERCISES: CPT | Mod: PN

## 2023-11-13 NOTE — PROGRESS NOTES
[facility]   Physical Therapy Treatment Note     Name: Khloe BAIN [facility] Number: [id number]    Therapy Diagnosis:   Encounter Diagnoses   Name Primary?    Decreased range of motion (ROM) of right knee Yes    Quadriceps weakness      Physician: Cosme Jim IV, MD    Visit Date: 11/13/2023    Physician Orders: PT Eval and Treat   Medical Diagnosis from Referral: S83.511A (ICD-10-CM) - Sprain of anterior cruciate ligament of right knee  Evaluation Date: 10/20/2023  Authorization Period Expiration: 12/31/2023  Plan of Care Expiration: 12/15/2023  Progress Note Due: 11/19/2023  Date of Surgery: 09/29/2023  Visit # / Visits authorized: 5/ 20 (+eval)  FOTO: 1/ 3      Precautions: Standard     PTA Visit #: 0/5     FOTO first follow up:   FOTO second follow up:     Time In: 0900  Time Out: 0956  Total Billable Time: 56 minutes    SUBJECTIVE     Pt reports:She is doing okay, her only problem at this time is flexing her knee.    She was compliant with home exercise program.  Response to previous treatment: improved knee flexion, less pain with flexion  Functional change: as above    Pain: 0/10 at rest, 8/10 with flexion  Location: right knee      OBJECTIVE     Objective Measures updated at progress report unless specified.     Right knee range of motion: -3/0/110 with over pressure     Quad set: moderate activation    Straight leg raise: 30 with no knee lag     Treatment     Post Op date 9/29/23: Sara is  6 weeks and 3 days  post op. As of 11/13/2023    Patient was 1:1 for entire session, Physical Therapy extender assisted with treatment    Khloe received the treatments listed below:    therapeutic exercises to develop strength, endurance, ROM, and flexibility for 23 minutes including:  Assessment as seen above  Heel prop x 5 minutes   Heel slides 3 x12 with 3# weight for overpressure, 3 second hold   Mini squat at head of table 3 x 10    manual therapy techniques: Joint  mobilizations and Soft tissue Mobilization were applied to the: Right knee for 13 minutes, including:  patellar mobilization in all planes (grade III)  Tibial AP grade III-IV  Inferior patellar glide with knee flexion   Medial to lateral tibial mobilization grade II-III     neuromuscular re-education activities to improve: Balance, Coordination, Kinesthetic, Sense, and Proprioception for 20 minutes. The following activities were included:  NMES level 38: 1.5 ramp, 5 second hold:  Quad set 4 minute  Supine straight leg raise 4 minute   TKE green theraband 4 minutes      Patient Education and Home Exercises     Home Exercises Provided and Patient Education Provided     Education provided:   - HEP education  - POC education    Written Home Exercises Provided: Patient instructed to cont prior HEP. Exercises were reviewed and Khloe was able to demonstrate them prior to the end of the session.  Khloe demonstrated good  understanding of the education provided. See EMR under Patient Instructions for exercises provided during therapy sessions    ASSESSMENT     Sara was able to complete 30 straight leg raises with no knee lag, she has full knee extension and much improved quad activation. She was discharged from her brace and educated on proper gait mechanics; She was apprehensive to discharge from the brace was informed that gait training can be completed to improve her confidence. At this time Sara has improved quad activation and general strength, her biggest deficit continues to be knee flexion. Her knee flexion was improved was medial to lateral mobilization. Therapy will continue to progress as necessary.     Khloe Is progressing well towards her goals.   Pt prognosis is Good.     Pt will continue to benefit from skilled outpatient physical therapy to address the deficits listed in the problem list box on initial evaluation, provide pt/family education and to maximize pt's level of independence in  the home and community environment.     Pt's spiritual, cultural and educational needs considered and pt agreeable to plan of care and goals.     Anticipated barriers to physical therapy: none    Goals:  Short Term Goals: 4 weeks. Pt agrees with goals set.  Pt will demonstrate independence and compliance with initial HEP to improve independence and symptom management.   Pt will report Right knee pain </= 2/10 on exertion with knee flexion to demonstrate improved condition and ability to sit comfortably, ambulate and complete transitional movements.    Pt will complete 30 straight leg raises without knee lag to display increased motor control, quad strength and ability to ambulate.  Pt will improve Right knee flexion ROM by >= 50 % to improve tolerance for sitting and ambulation.       Long Term Goals: 8 weeks. Pt agrees with goals set.   Pt will demonstrate independence and compliance with final HEP to continue managing symptoms and developing mobility, motor control and strength.    Pt will improve FOTO score to </= 35% limited to demonstrate improved functional mobility.    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    Pt will be able to complete 30 double leg squats with symmetrical loading of her legs, no knee valgus and no pain to display improved strength and ability to return to her PLOF.  Pt will improve Right knee ROM = to uninvolved side to improve tolerance for ambulation, sitting and squatting to improve ability to return to PLOF.    Pt goal: be able to walk and move it normally; have a normal functioning leg        PLAN     Develop knee range of motion and quadriceps strength; develop hip strength as tolerated.     Milad Zuluaga, PT

## 2023-11-15 ENCOUNTER — CLINICAL SUPPORT (OUTPATIENT)
Dept: REHABILITATION | Facility: HOSPITAL | Age: 23
End: 2023-11-15
Payer: MEDICAID

## 2023-11-15 DIAGNOSIS — M62.81 QUADRICEPS WEAKNESS: ICD-10-CM

## 2023-11-15 DIAGNOSIS — M25.661 DECREASED RANGE OF MOTION (ROM) OF RIGHT KNEE: Primary | ICD-10-CM

## 2023-11-15 PROCEDURE — 97110 THERAPEUTIC EXERCISES: CPT | Mod: PN

## 2023-11-15 NOTE — PROGRESS NOTES
[facility]   Physical Therapy Treatment Note     Name: Khloe BAIN [facility] Number: [id number]    Therapy Diagnosis:   Encounter Diagnoses   Name Primary?    Decreased range of motion (ROM) of right knee Yes    Quadriceps weakness        Physician: Cosme Jim IV, MD    Visit Date: 11/15/2023    Physician Orders: PT Eval and Treat   Medical Diagnosis from Referral: S83.511A (ICD-10-CM) - Sprain of anterior cruciate ligament of right knee  Evaluation Date: 10/20/2023  Authorization Period Expiration: 12/31/2023  Plan of Care Expiration: 12/15/2023  Progress Note Due: 11/19/2023  Date of Surgery: 09/29/2023  Visit # / Visits authorized: 6/ 20 (+eval)  FOTO: 2/ 3      Precautions: Standard     PTA Visit #: 0/5     FOTO first follow up: 11/15/2023  FOTO second follow up:     Time In: 0900  Time Out: 0955  Total Billable Time: 55 minutes    SUBJECTIVE     Pt reports:She is doing well, she is walking without the brace now and everything feels normal.     She was compliant with home exercise program.  Response to previous treatment: improved knee flexion, less pain with flexion  Functional change: as above    Pain: 0/10 at rest, 8/10 with flexion  Location: right knee      OBJECTIVE     Objective Measures updated at progress report unless specified.     Right knee range of motion: -3/0/115 with over pressure     Quad set: moderate activation    Straight leg raise: 30 with no knee lag     Treatment     Post Op date 9/29/23: Sara is  6 weeks and 5 days  post op. As of 11/15/2023    Patient was 1:1 for entire session, Physical Therapy extender assisted with treatment    Khloe received the treatments listed below:    therapeutic exercises to develop strength, endurance, ROM, and flexibility for 23 minutes including:  Assessment as seen above  Heel prop x 5 minutes   Heel slides 3 x12 with 3# weight for overpressure, 3 second hold   Mini squat at head of table 3 x 10  Double leg  shuttle 62#, 3 x 8   Single leg shuttle 37#, 3 x 8     manual therapy techniques: Joint mobilizations and Soft tissue Mobilization were applied to the: Right knee for 13 minutes, including:  patellar mobilization in all planes (grade III)  Tibial AP grade III-IV  Inferior patellar glide with knee flexion   Medial to lateral tibial mobilization grade II-III     neuromuscular re-education activities to improve: Balance, Coordination, Kinesthetic, Sense, and Proprioception for 19 minutes. The following activities were included:  NMES level 38: 1.5 ramp, 5 second hold:  Quad set 4 minute  Supine straight leg raise 5 minute   TKE green theraband 4 minutes      Patient Education and Home Exercises     Home Exercises Provided and Patient Education Provided     Education provided:   - HEP education  - POC education    Written Home Exercises Provided: Patient instructed to cont prior HEP. Exercises were reviewed and Khloe was able to demonstrate them prior to the end of the session.  Khloe demonstrated good  understanding of the education provided. See EMR under Patient Instructions for exercises provided during therapy sessions    ASSESSMENT     Sara completed therapy with no complications on today. Therapy focused on improving her knee flexion as well as her quadriceps motor control and stability. Her biggest limitation at this time is knee flexion, while it is improving there is pain inferior to patella and along the medial border. When improving her accessory joint motion the pian diminishes. The POC was discussed with her and the milestones we need to hit in order for her to reach her goals; she voiced her understanding. Therapy will progress as needed    Khloe Is progressing well towards her goals.   Pt prognosis is Good.     Pt will continue to benefit from skilled outpatient physical therapy to address the deficits listed in the problem list box on initial evaluation, provide pt/family education  and to maximize pt's level of independence in the home and community environment.     Pt's spiritual, cultural and educational needs considered and pt agreeable to plan of care and goals.     Anticipated barriers to physical therapy: none    Goals:  Short Term Goals: 4 weeks. Pt agrees with goals set.  Pt will demonstrate independence and compliance with initial HEP to improve independence and symptom management.   Pt will report Right knee pain </= 2/10 on exertion with knee flexion to demonstrate improved condition and ability to sit comfortably, ambulate and complete transitional movements.    Pt will complete 30 straight leg raises without knee lag to display increased motor control, quad strength and ability to ambulate.  Pt will improve Right knee flexion ROM by >= 50 % to improve tolerance for sitting and ambulation.       Long Term Goals: 8 weeks. Pt agrees with goals set.   Pt will demonstrate independence and compliance with final HEP to continue managing symptoms and developing mobility, motor control and strength.    Pt will improve FOTO score to </= 35% limited to demonstrate improved functional mobility.    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    Pt will be able to complete 30 double leg squats with symmetrical loading of her legs, no knee valgus and no pain to display improved strength and ability to return to her PLOF.  Pt will improve Right knee ROM = to uninvolved side to improve tolerance for ambulation, sitting and squatting to improve ability to return to PLOF.    Pt goal: be able to walk and move it normally; have a normal functioning leg        PLAN     Develop knee range of motion and quadriceps strength; develop hip strength as tolerated.     Milad Zuluaga, PT, DPT

## 2023-11-20 ENCOUNTER — CLINICAL SUPPORT (OUTPATIENT)
Dept: REHABILITATION | Facility: HOSPITAL | Age: 23
End: 2023-11-20
Payer: MEDICAID

## 2023-11-20 DIAGNOSIS — M62.81 QUADRICEPS WEAKNESS: ICD-10-CM

## 2023-11-20 DIAGNOSIS — M25.661 DECREASED RANGE OF MOTION (ROM) OF RIGHT KNEE: Primary | ICD-10-CM

## 2023-11-20 PROCEDURE — 97110 THERAPEUTIC EXERCISES: CPT | Mod: PN

## 2023-11-20 NOTE — PROGRESS NOTES
[facility]   Physical Therapy Treatment Note     Name: Khloe BAIN [facility] Number: [id number]    Therapy Diagnosis:   Encounter Diagnoses   Name Primary?    Decreased range of motion (ROM) of right knee Yes    Quadriceps weakness        Physician: Cosme Jim IV, MD    Visit Date: 11/20/2023    Physician Orders: PT Eval and Treat   Medical Diagnosis from Referral: S83.511A (ICD-10-CM) - Sprain of anterior cruciate ligament of right knee  Evaluation Date: 10/20/2023  Authorization Period Expiration: 12/31/2023  Plan of Care Expiration: 12/15/2023  Progress Note Due: 11/19/2023  Date of Surgery: 09/29/2023  Visit # / Visits authorized: 6/ 20 (+eval)  FOTO: 2/ 3      Precautions: Standard     PTA Visit #: 0/5     FOTO first follow up: 11/15/2023  FOTO second follow up:     Time In: 10:00  Time Out: 10:58  Total Billable Time: 58 minutes    SUBJECTIVE     Pt reports:She is doing well, her knee popped the other day and she noticed it is moving better.     She was compliant with home exercise program.  Response to previous treatment: improved knee flexion, less pain with flexion  Functional change: as above    Pain: 0/10 at rest, 8/10 with flexion  Location: right knee      OBJECTIVE     Objective Measures updated at progress report unless specified.     Right knee range of motion: -3/0/120 with over pressure     Quad set: moderate activation    Straight leg raise: 30 with no knee lag     Treatment     Post Op date 9/29/23: Sara is  7 weeks and 3 days  post op. As of 11/20/2023    Patient was 1:1 for entire session, Physical Therapy extender assisted with treatment    Khloe received the treatments listed below:    therapeutic exercises to develop strength, endurance, ROM, and flexibility for 30 minutes including:  Assessment as seen above  Heel prop x 5 minutes   Heel slides 3 x12 with 3# weight for overpressure, 3 second hold   Mini squat at head of table 3 x 10  Step ups 3 x  12 4in step,   Double leg shuttle 75#, 3 x 8   Single leg shuttle 50#, 3 x 8     manual therapy techniques: Joint mobilizations and Soft tissue Mobilization were applied to the: Right knee for 13 minutes, including:  patellar mobilization in all planes (grade III)  Tibial AP grade III-IV  Inferior patellar glide with knee flexion   Medial to lateral tibial mobilization grade II-III     neuromuscular re-education activities to improve: Balance, Coordination, Kinesthetic, Sense, and Proprioception for 15 minutes. The following activities were included:  Quad set 30  Supine straight leg raise 3 x 10   TKE green theraband  3 x 12, 3 second hold       Patient Education and Home Exercises     Home Exercises Provided and Patient Education Provided     Education provided:   - HEP education  - POC education    Written Home Exercises Provided: Patient instructed to cont prior HEP. Exercises were reviewed and Khloe was able to demonstrate them prior to the end of the session.  Khloe demonstrated good  understanding of the education provided. See EMR under Patient Instructions for exercises provided during therapy sessions    ASSESSMENT     Sara completed therapy with no complications on today. She continues to have steady improvements in her knee flexion and overall strength. She completed 30 straight leg raises without knee lag and has 125 degrees of knee flexion. Her biggest deficit is her quad strength at this time. Therapy will look to test her quad strength next visit and increase the load to her leg.     Khloe Is progressing well towards her goals.   Pt prognosis is Good.     Pt will continue to benefit from skilled outpatient physical therapy to address the deficits listed in the problem list box on initial evaluation, provide pt/family education and to maximize pt's level of independence in the home and community environment.     Pt's spiritual, cultural and educational needs considered and pt  agreeable to plan of care and goals.     Anticipated barriers to physical therapy: none    Goals:  Short Term Goals: 4 weeks. Pt agrees with goals set.  Pt will demonstrate independence and compliance with initial HEP to improve independence and symptom management.   Pt will report Right knee pain </= 2/10 on exertion with knee flexion to demonstrate improved condition and ability to sit comfortably, ambulate and complete transitional movements.    Pt will complete 30 straight leg raises without knee lag to display increased motor control, quad strength and ability to ambulate.  Pt will improve Right knee flexion ROM by >= 50 % to improve tolerance for sitting and ambulation.       Long Term Goals: 8 weeks. Pt agrees with goals set.   Pt will demonstrate independence and compliance with final HEP to continue managing symptoms and developing mobility, motor control and strength.    Pt will improve FOTO score to </= 35% limited to demonstrate improved functional mobility.    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    Pt will be able to complete 30 double leg squats with symmetrical loading of her legs, no knee valgus and no pain to display improved strength and ability to return to her PLOF.  Pt will improve Right knee ROM = to uninvolved side to improve tolerance for ambulation, sitting and squatting to improve ability to return to PLOF.    Pt goal: be able to walk and move it normally; have a normal functioning leg        PLAN     Develop knee range of motion and quadriceps strength; develop hip strength as tolerated.     Milad Zuluaga, PT, DPT

## 2023-11-24 ENCOUNTER — CLINICAL SUPPORT (OUTPATIENT)
Dept: REHABILITATION | Facility: HOSPITAL | Age: 23
End: 2023-11-24
Payer: MEDICAID

## 2023-11-24 DIAGNOSIS — M25.661 DECREASED RANGE OF MOTION (ROM) OF RIGHT KNEE: Primary | ICD-10-CM

## 2023-11-24 DIAGNOSIS — M62.81 QUADRICEPS WEAKNESS: ICD-10-CM

## 2023-11-24 PROCEDURE — 97110 THERAPEUTIC EXERCISES: CPT | Mod: PN

## 2023-11-24 NOTE — PROGRESS NOTES
[facility]   Physical Therapy Treatment Note     Name: Khloe BAIN [facility] Number: [id number]    Therapy Diagnosis:   Encounter Diagnoses   Name Primary?    Decreased range of motion (ROM) of right knee Yes    Quadriceps weakness        Physician: Cosme Jim IV, MD    Visit Date: 11/24/2023    Physician Orders: PT Eval and Treat   Medical Diagnosis from Referral: S83.511A (ICD-10-CM) - Sprain of anterior cruciate ligament of right knee  Evaluation Date: 10/20/2023  Authorization Period Expiration: 12/31/2023  Plan of Care Expiration: 12/15/2023  Progress Note Due: 11/19/2023  Date of Surgery: 09/29/2023  Visit # / Visits authorized: 8/ 20 (+eval)  FOTO: 2/ 3      Precautions: Standard     PTA Visit #: 0/5     FOTO first follow up: 11/15/2023  FOTO second follow up:     Time In: 9:00  Time Out: 9:59  Total Billable Time: 59 minutes    SUBJECTIVE     Pt reports: She feels like a bus hit her, she is feeling the effects of thanksgiving food.     She was compliant with home exercise program.  Response to previous treatment: improved knee flexion, less pain with flexion  Functional change: as above    Pain: 0/10 at rest, 8/10 with flexion  Location: right knee      OBJECTIVE     Objective Measures updated at progress report unless specified.     Right knee range of motion: -3/0/120 with over pressure     Quad set: moderate activation    Straight leg raise: 30 with no knee lag     Date: 11/24/2023 Left Right    55.58 40.36    67.43 50.9    83.14 53.74    53.74/83.14: 64.6% quad symmetry     Treatment     Post Op date 9/29/23: Sara is  8 weeks  post op. As of 11/24/2023    Patient was 1:1 for entire session, Physical Therapy extender assisted with treatment    Khloe received the treatments listed below:    therapeutic exercises to develop strength, endurance, ROM, and flexibility for 31 minutes including:  Assessment as seen above  Heel prop x 5 minutes   Heel slides 3 x12 with  3# weight for overpressure, 3 second hold   24 in box squat x20  Knee extension machine 15#, 3 x8   Single leg squat (step down from 4in step), 3 x 8   Double leg shuttle 100#, 3 x 8   Single leg shuttle 62#, 3 x 8     manual therapy techniques: Joint mobilizations and Soft tissue Mobilization were applied to the: Right knee for 13 minutes, including:  patellar mobilization in all planes (grade III)  Tibial AP grade III-IV  Inferior patellar glide with knee flexion   Medial to lateral tibial mobilization grade II-III     neuromuscular re-education activities to improve: Balance, Coordination, Kinesthetic, Sense, and Proprioception for 15 minutes. The following activities were included:  Quad set 30, 3 second hold   Supine straight leg raise 3 x 10         Patient Education and Home Exercises     Home Exercises Provided and Patient Education Provided     Education provided:   - HEP education  - POC education    Written Home Exercises Provided: Patient instructed to cont prior HEP. Exercises were reviewed and Khloe was able to demonstrate them prior to the end of the session.  Khloe demonstrated good  understanding of the education provided. See EMR under Patient Instructions for exercises provided during therapy sessions    ASSESSMENT     Sara completed therapy with no complications. Therapy tested her quad strength and was found to be 64%. Therapy also progressed the strengthening and she tolerated all of this well. Therapy will look to implement more single leg strengthening and progress as tolerated.     Khloe Is progressing well towards her goals.   Pt prognosis is Good.     Pt will continue to benefit from skilled outpatient physical therapy to address the deficits listed in the problem list box on initial evaluation, provide pt/family education and to maximize pt's level of independence in the home and community environment.     Pt's spiritual, cultural and educational needs considered and  pt agreeable to plan of care and goals.     Anticipated barriers to physical therapy: none    Goals:  Short Term Goals: 4 weeks. Pt agrees with goals set.  Pt will demonstrate independence and compliance with initial HEP to improve independence and symptom management.   Pt will report Right knee pain </= 2/10 on exertion with knee flexion to demonstrate improved condition and ability to sit comfortably, ambulate and complete transitional movements.    Pt will complete 30 straight leg raises without knee lag to display increased motor control, quad strength and ability to ambulate.  Pt will improve Right knee flexion ROM by >= 50 % to improve tolerance for sitting and ambulation.       Long Term Goals: 8 weeks. Pt agrees with goals set.   Pt will demonstrate independence and compliance with final HEP to continue managing symptoms and developing mobility, motor control and strength.    Pt will improve FOTO score to </= 35% limited to demonstrate improved functional mobility.    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    Pt will be able to complete 30 double leg squats with symmetrical loading of her legs, no knee valgus and no pain to display improved strength and ability to return to her PLOF.  Pt will improve Right knee ROM = to uninvolved side to improve tolerance for ambulation, sitting and squatting to improve ability to return to PLOF.    Pt goal: be able to walk and move it normally; have a normal functioning leg        PLAN     Develop knee range of motion and quadriceps strength; develop hip strength as tolerated.     Milad Zuluaga, PT, DPT

## 2023-11-29 ENCOUNTER — CLINICAL SUPPORT (OUTPATIENT)
Dept: REHABILITATION | Facility: HOSPITAL | Age: 23
End: 2023-11-29
Payer: MEDICAID

## 2023-11-29 DIAGNOSIS — M25.661 DECREASED RANGE OF MOTION (ROM) OF RIGHT KNEE: Primary | ICD-10-CM

## 2023-11-29 DIAGNOSIS — M62.81 QUADRICEPS WEAKNESS: ICD-10-CM

## 2023-11-29 PROCEDURE — 97110 THERAPEUTIC EXERCISES: CPT | Mod: PN

## 2023-11-29 NOTE — PROGRESS NOTES
[facility]   Physical Therapy Treatment Note     Name: Khloe BAIN [facility] Number: [id number]    Therapy Diagnosis:   Encounter Diagnoses   Name Primary?    Decreased range of motion (ROM) of right knee Yes    Quadriceps weakness        Physician: Cosme Jim IV, MD    Visit Date: 11/29/2023    Physician Orders: PT Eval and Treat   Medical Diagnosis from Referral: S83.511A (ICD-10-CM) - Sprain of anterior cruciate ligament of right knee  Evaluation Date: 10/20/2023  Authorization Period Expiration: 12/31/2023  Plan of Care Expiration: 12/15/2023  Progress Note Due: 11/19/2023  Date of Surgery: 09/29/2023  Visit # / Visits authorized: 9/ 20 (+eval)  FOTO: 2/ 3      Precautions: Standard     PTA Visit #: 0/5     FOTO first follow up: 11/15/2023  FOTO second follow up:     Time In: 9:00  Time Out: 9:55  Total Billable Time: 55 minutes    SUBJECTIVE     Pt reports: She feels is feeling a little stiff today, but that is because of the cold.    She was compliant with home exercise program.  Response to previous treatment: improved knee flexion, less pain with flexion  Functional change: as above    Pain: 0/10 at rest, 8/10 with flexion  Location: right knee      OBJECTIVE     Objective Measures updated at progress report unless specified.     Right knee range of motion: -3/0/130 with over pressure     Quad set: moderate activation    Straight leg raise: 30 with no knee lag     Date: 11/24/2023 Left Right    55.58 40.36    67.43 50.9    83.14 53.74    53.74/83.14: 64.6% quad symmetry     Treatment     Post Op date 9/29/23: Sara is  8 weeks  post op. As of 11/29/2023    Khloe received the treatments listed below:    therapeutic exercises to develop strength, endurance, ROM, and flexibility for 31 minutes including:  Assessment as seen above  Heel prop x 5 minutes   Heel slides 3 x12 with 3# weight for overpressure, 3 second hold   24 in box squat x20  Knee extension machine 15#,  3 x8   Single leg squat, 3 x 8   Sidelying clamshells 3 x 12, 3 second hold     manual therapy techniques: Joint mobilizations and Soft tissue Mobilization were applied to the: Right knee for 13 minutes, including:  patellar mobilization in all planes (grade III)  Tibial AP grade III-IV  Inferior patellar glide with knee flexion   Medial to lateral tibial mobilization grade II-III     neuromuscular re-education activities to improve: Balance, Coordination, Kinesthetic, Sense, and Proprioception for 11 minutes. The following activities were included:  Quad set 30, 3 second hold   Supine straight leg raise 3 x 10         Patient Education and Home Exercises     Home Exercises Provided and Patient Education Provided     Education provided:   - HEP education (HEP expanded)  - POC education    Written Home Exercises Provided: Patient instructed to cont prior HEP. Exercises were reviewed and Khloe was able to demonstrate them prior to the end of the session.  Khloe demonstrated good  understanding of the education provided. See EMR under Patient Instructions for exercises provided during therapy sessions    ASSESSMENT     Sara completed therapy with no complications. Therapy focused on improving her range of motion and strength; her biggest deficits at this time is her knee flexion. Therapy has progressed her to single leg strengthening and she tolerated it well. Therapy will look to progress as tolerated.    Khloe Is progressing well towards her goals.   Pt prognosis is Good.     Pt will continue to benefit from skilled outpatient physical therapy to address the deficits listed in the problem list box on initial evaluation, provide pt/family education and to maximize pt's level of independence in the home and community environment.     Pt's spiritual, cultural and educational needs considered and pt agreeable to plan of care and goals.     Anticipated barriers to physical therapy:  none    Goals:  Short Term Goals: 4 weeks. Pt agrees with goals set.  Pt will demonstrate independence and compliance with initial HEP to improve independence and symptom management.   Pt will report Right knee pain </= 2/10 on exertion with knee flexion to demonstrate improved condition and ability to sit comfortably, ambulate and complete transitional movements.    Pt will complete 30 straight leg raises without knee lag to display increased motor control, quad strength and ability to ambulate.  Pt will improve Right knee flexion ROM by >= 50 % to improve tolerance for sitting and ambulation.       Long Term Goals: 8 weeks. Pt agrees with goals set.   Pt will demonstrate independence and compliance with final HEP to continue managing symptoms and developing mobility, motor control and strength.    Pt will improve FOTO score to </= 35% limited to demonstrate improved functional mobility.    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    Pt will be able to complete 30 double leg squats with symmetrical loading of her legs, no knee valgus and no pain to display improved strength and ability to return to her PLOF.  Pt will improve Right knee ROM = to uninvolved side to improve tolerance for ambulation, sitting and squatting to improve ability to return to PLOF.    Pt goal: be able to walk and move it normally; have a normal functioning leg        PLAN     Develop knee range of motion and quadriceps strength; develop hip strength as tolerated.     Milad Zuluaga, PT, DPT

## 2023-12-01 ENCOUNTER — CLINICAL SUPPORT (OUTPATIENT)
Dept: REHABILITATION | Facility: HOSPITAL | Age: 23
End: 2023-12-01
Payer: MEDICAID

## 2023-12-01 DIAGNOSIS — M25.661 DECREASED RANGE OF MOTION (ROM) OF RIGHT KNEE: Primary | ICD-10-CM

## 2023-12-01 DIAGNOSIS — M62.81 QUADRICEPS WEAKNESS: ICD-10-CM

## 2023-12-01 PROCEDURE — 97110 THERAPEUTIC EXERCISES: CPT | Mod: PN,CQ

## 2023-12-01 NOTE — PROGRESS NOTES
[facility] OUTPATIENT THERAPY AND WELLNESS   Physical Therapy Treatment Note     Name: Khloe BAIN [facility] Number: [id number]    Therapy Diagnosis:   Encounter Diagnoses   Name Primary?    Decreased range of motion (ROM) of right knee Yes    Quadriceps weakness        Physician: Cosme Jmi IV, MD    Visit Date: 12/1/2023    Physician Orders: PT Eval and Treat   Medical Diagnosis from Referral: S83.511A (ICD-10-CM) - Sprain of anterior cruciate ligament of right knee  Evaluation Date: 10/20/2023  Authorization Period Expiration: 12/31/2023  Plan of Care Expiration: 12/15/2023  Progress Note Due: 11/19/2023  Date of Surgery: 09/29/2023  Visit # / Visits authorized: 9/ 20 (+eval)  FOTO: 2/ 3      Precautions: Standard     PTA Visit #: 0/5     FOTO first follow up: 11/15/2023  FOTO second follow up:     Time In: 9:00  Time Out: 9:55  Total Billable Time: 55 minutes    SUBJECTIVE     Pt reports: went back to work last night, but did alright.   She was compliant with home exercise program.  Response to previous treatment: improved knee flexion, less pain with flexion  Functional change: as above    Pain: 0/10 at rest, 8/10 with flexion  Location: right knee      OBJECTIVE     Objective Measures updated at progress report unless specified.       Treatment     Post Op date 9/29/23: Sara is  8 weeks  post op. As of 11/29/2023    Khloe received the treatments listed below:      therapeutic exercises to develop strength, endurance, ROM, and flexibility for 31 minutes including:    Heel prop x 5 minutes   Heel slides 3 x12 with 3# weight for overpressure, 3 second hold   24 in box squat x 20  Knee extension machine 15#, 3 x 8   Single leg squat, 3 x 8   Sidelying clamshells 3 x 12, 3 second hold     manual therapy techniques: Joint mobilizations and Soft tissue Mobilization were applied to the: Right knee for 13 minutes, including:    patellar mobilization in all planes (grade III)  Tibial AP grade  III-IV  Inferior patellar glide with knee flexion   Medial to lateral tibial mobilization grade II-III     neuromuscular re-education activities to improve: Balance, Coordination, Kinesthetic, Sense, and Proprioception for 11 minutes. The following activities were included:    Quad set 30, 3 second hold   Supine straight leg raise 3 x 10       Patient Education and Home Exercises     Home Exercises Provided and Patient Education Provided     Education provided:   - HEP education (HEP expanded)  - POC education    Written Home Exercises Provided: Patient instructed to cont prior HEP. Exercises were reviewed and Khloe was able to demonstrate them prior to the end of the session.  Khloe demonstrated good  understanding of the education provided. See EMR under Patient Instructions for exercises provided during therapy sessions    ASSESSMENT     Sara completed therapy with no complications. Challenged with single leg strength and stability. Cueing to reduce ankle eversion due to limited knee flexion, improved after repetitions.      Khloe Is progressing well towards her goals.   Pt prognosis is Good.     Pt will continue to benefit from skilled outpatient physical therapy to address the deficits listed in the problem list box on initial evaluation, provide pt/family education and to maximize pt's level of independence in the home and community environment.     Pt's spiritual, cultural and educational needs considered and pt agreeable to plan of care and goals.     Anticipated barriers to physical therapy: none    Goals:  Short Term Goals: 4 weeks. Pt agrees with goals set.  Pt will demonstrate independence and compliance with initial HEP to improve independence and symptom management.   Pt will report Right knee pain </= 2/10 on exertion with knee flexion to demonstrate improved condition and ability to sit comfortably, ambulate and complete transitional movements.    Pt will complete 30 straight  leg raises without knee lag to display increased motor control, quad strength and ability to ambulate.  Pt will improve Right knee flexion ROM by >= 50 % to improve tolerance for sitting and ambulation.       Long Term Goals: 8 weeks. Pt agrees with goals set.   Pt will demonstrate independence and compliance with final HEP to continue managing symptoms and developing mobility, motor control and strength.    Pt will improve FOTO score to </= 35% limited to demonstrate improved functional mobility.    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    Pt will be able to complete 30 double leg squats with symmetrical loading of her legs, no knee valgus and no pain to display improved strength and ability to return to her PLOF.  Pt will improve Right knee ROM = to uninvolved side to improve tolerance for ambulation, sitting and squatting to improve ability to return to PLOF.    Pt goal: be able to walk and move it normally; have a normal functioning leg        PLAN     Develop knee range of motion and quadriceps strength; develop hip strength as tolerated.     Phylicia Lizarraga, PTA,

## 2023-12-04 ENCOUNTER — CLINICAL SUPPORT (OUTPATIENT)
Dept: REHABILITATION | Facility: HOSPITAL | Age: 23
End: 2023-12-04
Payer: MEDICAID

## 2023-12-04 DIAGNOSIS — M62.81 QUADRICEPS WEAKNESS: ICD-10-CM

## 2023-12-04 DIAGNOSIS — M25.661 DECREASED RANGE OF MOTION (ROM) OF RIGHT KNEE: Primary | ICD-10-CM

## 2023-12-04 PROCEDURE — 97110 THERAPEUTIC EXERCISES: CPT | Mod: PN

## 2023-12-04 NOTE — PROGRESS NOTES
[facility] OUTPATIENT THERAPY AND WELLNESS   Physical Therapy Treatment Note     Name: Khloe BAIN [facility] Number: [id number]    Therapy Diagnosis:   Encounter Diagnoses   Name Primary?    Decreased range of motion (ROM) of right knee Yes    Quadriceps weakness        Physician: Cosme Jim IV, MD    Visit Date: 12/4/2023    Physician Orders: PT Eval and Treat   Medical Diagnosis from Referral: S83.511A (ICD-10-CM) - Sprain of anterior cruciate ligament of right knee  Evaluation Date: 10/20/2023  Authorization Period Expiration: 12/31/2023  Plan of Care Expiration: 12/15/2023   Progress Note Due: 11/19/2023  Date of Surgery: 09/29/2023  Visit # / Visits authorized: 11/ 20 (+eval)  FOTO: 2/ 3      Precautions: Standard     PTA Visit #: 0/5     FOTO first follow up: 11/15/2023  FOTO second follow up:     Time In: 9:00  Time Out: 9:55  Total Billable Time: 55 minutes    SUBJECTIVE     Pt reports: She is doing well, she is going to work again today. But her leg is doing well.  She was compliant with home exercise program.  Response to previous treatment: improved knee flexion, less pain with flexion  Functional change: as above    Pain: 0/10 at rest, 8/10 with flexion  Location: right knee      OBJECTIVE     Objective Measures updated at progress report unless specified.     Knee extension passive range of motion: 3/0/125    Treatment     Post Op date 9/29/23: Sara is  8 weeks 5 day(s)  post op. As of 11/29/2023    Khloe received the treatments listed below:      therapeutic exercises to develop strength, endurance, ROM, and flexibility for 31 minutes including:    Heel slides 3 x12 with 3# weight for overpressure, 3 second hold   24 in box squat x 20  Knee extension machine 15#, 3 x 8   Knee flexion machine 30#, 3 x 8   Single leg squat, 4 x 8   Wall squat with red theraball and 20# KB 3 x 8   Sidelying clamshells 3 x 12, red theraband 3 second hold     manual therapy techniques: Joint mobilizations and  Soft tissue Mobilization were applied to the: Right knee for 15 minutes, including:    patellar mobilization in all planes (grade III)  Tibial AP grade III-IV  Inferior patellar glide with knee flexion   Medial to lateral tibial mobilization grade II-III     neuromuscular re-education activities to improve: Balance, Coordination, Kinesthetic, Sense, and Proprioception for 8 minutes. The following activities were included:    Quad set 30, 3 second hold   Supine straight leg raise 3 x 12       Patient Education and Home Exercises     Home Exercises Provided and Patient Education Provided     Education provided:   - HEP education (HEP expanded)  - POC education    Written Home Exercises Provided: Patient instructed to cont prior HEP. Exercises were reviewed and Khloe was able to demonstrate them prior to the end of the session.  Khloe demonstrated good  understanding of the education provided. See EMR under Patient Instructions for exercises provided during therapy sessions    ASSESSMENT     Sara completed therapy with no complications. She continues to lack knee flexion range of motion at this time; improvement noted with manual therapy. Therapy continues to implement single leg strengthening as tolerated and she continues to display good responses to these exercises. Therapy will continue to progress as necessary.     Khloe Is progressing well towards her goals.   Pt prognosis is Good.     Pt will continue to benefit from skilled outpatient physical therapy to address the deficits listed in the problem list box on initial evaluation, provide pt/family education and to maximize pt's level of independence in the home and community environment.     Pt's spiritual, cultural and educational needs considered and pt agreeable to plan of care and goals.     Anticipated barriers to physical therapy: none    Goals:  Short Term Goals: 4 weeks. Pt agrees with goals set.  Pt will demonstrate independence and  compliance with initial HEP to improve independence and symptom management.   Pt will report Right knee pain </= 2/10 on exertion with knee flexion to demonstrate improved condition and ability to sit comfortably, ambulate and complete transitional movements.    Pt will complete 30 straight leg raises without knee lag to display increased motor control, quad strength and ability to ambulate.  Pt will improve Right knee flexion ROM by >= 50 % to improve tolerance for sitting and ambulation.       Long Term Goals: 8 weeks. Pt agrees with goals set.   Pt will demonstrate independence and compliance with final HEP to continue managing symptoms and developing mobility, motor control and strength.    Pt will improve FOTO score to </= 35% limited to demonstrate improved functional mobility.    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    Pt will be able to complete 30 double leg squats with symmetrical loading of her legs, no knee valgus and no pain to display improved strength and ability to return to her PLOF.  Pt will improve Right knee ROM = to uninvolved side to improve tolerance for ambulation, sitting and squatting to improve ability to return to PLOF.    Pt goal: be able to walk and move it normally; have a normal functioning leg        PLAN   Reasons for Recertification of Therapy:   Sara does not possess full knee range of motion or strength at this time. This is limiting her from performing single leg task, performing her ADLs, work related task and participating in leisurely activities. She needs more visits and  an extended POC to achieve these things.    Updated Certification Period: 12/4/2023 to 12/31/2023  Recommended Treatment Plan: 2 times per week for 4 weeks: Electrical Stimulation NMES, Gait Training, Manual Therapy, Moist Heat/ Ice, Neuromuscular  Re-ed, Patient Education, Self Care, Therapeutic Activities, and Therapeutic Exercise  Other Recommendations: Develop knee range of motion, quad motor control and develop single leg strength as tolerated.    Milad Zuluaga, PT, DPT  12/4/2023      I CERTIFY THE NEED FOR THESE SERVICES FURNISHED UNDER THIS PLAN OF TREATMENT AND WHILE UNDER MY CARE    Physician's comments:        Physician's Signature: ___________________________________________________

## 2023-12-04 NOTE — PLAN OF CARE
[facility] OUTPAT[facility] THERAPY AND [facility]LLNESS   Physical Therapy Treatment Note     Name: Khloe BAIN [facility] Number: [id number]    Therapy Diagnosis:   Encounter Diagnoses   Name Primary?    Decreased range of motion (ROM) of right knee Yes    Quadriceps weakness        Physician: Cosme Jim IV, MD    Visit Date: 12/4/2023    Physician Orders: PT Eval and Treat   Medical Diagnosis from Referral: S83.511A (ICD-10-CM) - Sprain of anterior cruciate ligament of right knee  Evaluation Date: 10/20/2023  Authorization Period Expiration: 12/31/2023  Plan of Care Expiration: 12/15/2023   Progress Note Due: 11/19/2023  Date of Surgery: 09/29/2023  Visit # / Visits authorized: 11/ 20 (+eval)  FOTO: 2/ 3      Precautions: Standard     PTA Visit #: 0/5     FOTO first follow up: 11/15/2023  FOTO second follow up:     Time In: 9:00  Time Out: 9:55  Total Billable Time: 55 minutes    SUBJECTIVE     Pt reports: She is doing well, she is going to work again today. But her leg is doing well.  She was compliant with home exercise program.  Response to previous treatment: improved knee flexion, less pain with flexion  Functional change: as above    Pain: 0/10 at rest, 8/10 with flexion  Location: right knee      OBJECTIVE     Objective Measures updated at progress report unless specified.     Knee extension passive range of motion: 3/0/125  30 straight leg raises with no knee lag    Limited Patellar lateral and medial glide     Treatment     Post Op date 9/29/23: Sara is  8 weeks 5 day(s)  post op. As of 11/29/2023    Khloe received the treatments listed below:      therapeutic exercises to develop strength, endurance, ROM, and flexibility for 31 minutes including:    Heel slides 3 x12 with 3# weight for overpressure, 3 second hold   24 in box squat x 20  Knee extension machine 15#, 3 x 8   Knee flexion machine 30#, 3 x 8   Single leg squat, 4 x 8   Wall squat with red theraball and 20# KB 3 x 8   Sidelying clamshells 3 x  12, red theraband 3 second hold     manual therapy techniques: Joint mobilizations and Soft tissue Mobilization were applied to the: Right knee for 15 minutes, including:    patellar mobilization in all planes (grade III)  Tibial AP grade III-IV  Inferior patellar glide with knee flexion   Medial to lateral tibial mobilization grade II-III     neuromuscular re-education activities to improve: Balance, Coordination, Kinesthetic, Sense, and Proprioception for 8 minutes. The following activities were included:    Quad set 30, 3 second hold   Supine straight leg raise 3 x 12       Patient Education and Home Exercises     Home Exercises Provided and Patient Education Provided     Education provided:   - HEP education (HEP expanded)  - POC education    Written Home Exercises Provided: Patient instructed to cont prior HEP. Exercises were reviewed and Khloe was able to demonstrate them prior to the end of the session.  Khloe demonstrated good  understanding of the education provided. See EMR under Patient Instructions for exercises provided during therapy sessions    ASSESSMENT     Sara completed therapy with no complications. She continues to lack knee flexion range of motion at this time; improvement noted with manual therapy. Therapy continues to implement single leg strengthening as tolerated and she continues to display good responses to these exercises. Therapy will continue to progress as necessary.     Khloe Is progressing well towards her goals.   Pt prognosis is Good.     Pt will continue to benefit from skilled outpatient physical therapy to address the deficits listed in the problem list box on initial evaluation, provide pt/family education and to maximize pt's level of independence in the home and community environment.     Pt's spiritual, cultural and educational needs considered and pt agreeable to plan of care and goals.     Anticipated barriers to physical therapy:  none    Goals:  Short Term Goals: 4 weeks. Pt agrees with goals set.  Pt will demonstrate independence and compliance with initial HEP to improve independence and symptom management.   Pt will report Right knee pain </= 2/10 on exertion with knee flexion to demonstrate improved condition and ability to sit comfortably, ambulate and complete transitional movements.    Pt will complete 30 straight leg raises without knee lag to display increased motor control, quad strength and ability to ambulate.  Pt will improve Right knee flexion ROM by >= 50 % to improve tolerance for sitting and ambulation.       Long Term Goals: 8 weeks. Pt agrees with goals set.   Pt will demonstrate independence and compliance with final HEP to continue managing symptoms and developing mobility, motor control and strength.    Pt will improve FOTO score to </= 35% limited to demonstrate improved functional mobility.    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    Pt will be able to complete 30 double leg squats with symmetrical loading of her legs, no knee valgus and no pain to display improved strength and ability to return to her PLOF.  Pt will improve Right knee ROM = to uninvolved side to improve tolerance for ambulation, sitting and squatting to improve ability to return to PLOF.    Pt goal: be able to walk and move it normally; have a normal functioning leg        PLAN   Reasons for Recertification of Therapy:   Sara does not possess full knee range of motion or strength at this time. This is limiting her from performing single leg task, performing her ADLs, work related task and participating in leisurely activities. She needs more visits and  an extended POC to achieve these things.    Updated Certification Period: 12/4/2023 to 12/31/2023  Recommended Treatment Plan: 2 times per week  for 4 weeks: Electrical Stimulation NMES, Gait Training, Manual Therapy, Moist Heat/ Ice, Neuromuscular Re-ed, Patient Education, Self Care, Therapeutic Activities, and Therapeutic Exercise  Other Recommendations: Develop knee range of motion, quad motor control and develop single leg strength as tolerated.    Milad Zuluaga, PT, DPT  12/4/2023      I CERTIFY THE NEED FOR THESE SERVICES FURNISHED UNDER THIS PLAN OF TREATMENT AND WHILE UNDER MY CARE    Physician's comments:        Physician's Signature: ___________________________________________________

## 2023-12-11 ENCOUNTER — CLINICAL SUPPORT (OUTPATIENT)
Dept: REHABILITATION | Facility: HOSPITAL | Age: 23
End: 2023-12-11
Payer: MEDICAID

## 2023-12-11 DIAGNOSIS — M62.81 QUADRICEPS WEAKNESS: ICD-10-CM

## 2023-12-11 DIAGNOSIS — M25.661 DECREASED RANGE OF MOTION (ROM) OF RIGHT KNEE: Primary | ICD-10-CM

## 2023-12-11 PROCEDURE — 97110 THERAPEUTIC EXERCISES: CPT | Mod: PN,CQ

## 2023-12-11 NOTE — PROGRESS NOTES
[facility]   Physical Therapy Treatment Note     Name: Khloe BAIN [facility] Number: [id number]    Therapy Diagnosis:   Encounter Diagnoses   Name Primary?    Decreased range of motion (ROM) of right knee Yes    Quadriceps weakness        Physician: Cosme Jim IV, MD    Visit Date: 12/11/2023    Physician Orders: PT Eval and Treat   Medical Diagnosis from Referral: S83.511A (ICD-10-CM) - Sprain of anterior cruciate ligament of right knee  Evaluation Date: 10/20/2023  Authorization Period Expiration: 12/31/2023  Plan of Care Expiration: 12/15/2023  Progress Note Due: 11/19/2023  Date of Surgery: 09/29/2023  Visit # / Visits authorized: 9/ 20 (+eval)  FOTO: 2/ 3      Precautions: Standard     PTA Visit #: 1/5     FOTO first follow up: 11/15/2023  FOTO second follow up:     Time In: 9:30  Time Out: 1030  Total Billable Time: 55 minutes    SUBJECTIVE     Pt reports: Stiffness this morning. She struggled her first two days back at work but now her knee does well at work.   She was compliant with home exercise program.  Response to previous treatment: improved knee flexion, less pain with flexion  Functional change: as above    Pain: 0/10 at rest, 8/10 with flexion  Location: right knee      OBJECTIVE     Objective Measures updated at progress report unless specified.       Treatment     Post Op date 9/29/23: Sara is  8 weeks  post op. As of 11/29/2023    Khloe received the treatments listed below:      therapeutic exercises to develop strength, endurance, ROM, and flexibility for 31 minutes including:      Heel slides 3 x12 with 3# weight for overpressure, 3 second hold   24 in box squat x 20  Knee extension machine 15#, 3 x 8   Knee flexion machine 30#, 3 x 8   Single leg squat, 4 x 8   Wall squat with red theraball and 20# KB 3 x 8   Sidelying clamshells 3 x 12, red theraband 3 second hold     manual therapy techniques: Joint mobilizations and Soft tissue Mobilization were  applied to the: Right knee for 13 minutes, including:    patellar mobilization in all planes (grade III)  Tibial AP grade III-IV  Inferior patellar glide with knee flexion   Medial to lateral tibial mobilization grade II-III for knee flexion    neuromuscular re-education activities to improve: Balance, Coordination, Kinesthetic, Sense, and Proprioception for 11 minutes. The following activities were included:    Quad set 30, 3 second hold   Supine straight leg raise 3 x 10   Against the wall hip abduction isometrics with red swiss ball 5 seconds holds x 10 repetitions       Patient Education and Home Exercises     Home Exercises Provided and Patient Education Provided     Education provided:   - HEP education (HEP expanded)  - POC education    Written Home Exercises Provided: Patient instructed to cont prior HEP. Exercises were reviewed and Khloe was able to demonstrate them prior to the end of the session.  Khloe demonstrated good  understanding of the education provided. See EMR under Patient Instructions for exercises provided during therapy sessions    ASSESSMENT     Sara noted with good tolerance to treatment. Incorporated more hip abduction strengthening to improve her knee control during single leg squats. She noted with compensations but able to improve with cueing. Will continue to benefit from skilled Physical Therapy to maximize strength gains as able.     Khloe Is progressing well towards her goals.   Pt prognosis is Good.     Pt will continue to benefit from skilled outpatient physical therapy to address the deficits listed in the problem list box on initial evaluation, provide pt/family education and to maximize pt's level of independence in the home and community environment.     Pt's spiritual, cultural and educational needs considered and pt agreeable to plan of care and goals.     Anticipated barriers to physical therapy: none    Goals:  Short Term Goals: 4 weeks. Pt agrees  with goals set.  Pt will demonstrate independence and compliance with initial HEP to improve independence and symptom management.   Pt will report Right knee pain </= 2/10 on exertion with knee flexion to demonstrate improved condition and ability to sit comfortably, ambulate and complete transitional movements.    Pt will complete 30 straight leg raises without knee lag to display increased motor control, quad strength and ability to ambulate.  Pt will improve Right knee flexion ROM by >= 50 % to improve tolerance for sitting and ambulation.       Long Term Goals: 8 weeks. Pt agrees with goals set.   Pt will demonstrate independence and compliance with final HEP to continue managing symptoms and developing mobility, motor control and strength.    Pt will improve FOTO score to </= 35% limited to demonstrate improved functional mobility.    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    Pt will be able to complete 30 double leg squats with symmetrical loading of her legs, no knee valgus and no pain to display improved strength and ability to return to her PLOF.  Pt will improve Right knee ROM = to uninvolved side to improve tolerance for ambulation, sitting and squatting to improve ability to return to PLOF.    Pt goal: be able to walk and move it normally; have a normal functioning leg        PLAN     Develop knee range of motion and quadriceps strength; develop hip strength as tolerated.     Margie Grubbs PTA,

## 2023-12-13 ENCOUNTER — CLINICAL SUPPORT (OUTPATIENT)
Dept: REHABILITATION | Facility: HOSPITAL | Age: 23
End: 2023-12-13
Payer: MEDICAID

## 2023-12-13 DIAGNOSIS — M25.661 DECREASED RANGE OF MOTION (ROM) OF RIGHT KNEE: Primary | ICD-10-CM

## 2023-12-13 DIAGNOSIS — M62.81 QUADRICEPS WEAKNESS: ICD-10-CM

## 2023-12-13 PROCEDURE — 97110 THERAPEUTIC EXERCISES: CPT | Mod: PN

## 2023-12-13 NOTE — PLAN OF CARE
[facility]   Physical Therapy Treatment Note     Name: Khloe BAIN [facility] Number: [id number]    Therapy Diagnosis:   Encounter Diagnoses   Name Primary?    Decreased range of motion (ROM) of right knee Yes    Quadriceps weakness        Physician: Cosme Jim IV, MD    Visit Date: 12/13/2023    Physician Orders: PT Eval and Treat   Medical Diagnosis from Referral: S83.511A (ICD-10-CM) - Sprain of anterior cruciate ligament of right knee  Evaluation Date: 10/20/2023  Authorization Period Expiration: 12/31/2023  Plan of Care Expiration: 12/15/2023  Progress Note Due: 11/19/2023  Date of Surgery: 09/29/2023  Visit # / Visits authorized: 13/ 20 (+eval)  FOTO: 2/ 3      Precautions: Standard     PTA Visit #: 0/5     FOTO first follow up: 11/15/2023  FOTO second follow up:     Time In: 10:00  Time Out: 1054  Total Billable Time: 54 minutes    SUBJECTIVE     Pt reports: She woke up about the 30 minutes ago so she is feeling pretty stiff.   She was compliant with home exercise program.  Response to previous treatment: improved knee flexion, less pain with flexion  Functional change: as above    Pain: 0/10 at rest, 8/10 with flexion  Location: right knee      OBJECTIVE     Objective Measures updated at progress report unless specified.     Date Left Right   12/13/2023 49.72 lb 41.82 lb    61.24 lb 62.68 lb    74.36 lb 63.81 lb    63.81/74.36 = 85%    Knee passive range of motion: -3/0/130  Treatment     Post Op date 9/29/23: Sara is  10 weeks  post op. As of 12/132023    Khloe received the treatments listed below:      therapeutic exercises to develop strength, endurance, ROM, and flexibility for 19 minutes including:    Heel slides 3 x12 with 3# weight for overpressure, 3 second hold   Knee extension machine 20#, 3 x 8   Single leg squat, 4 x 8     manual therapy techniques: Joint mobilizations and Soft tissue Mobilization were applied to the: Right knee for 10 minutes,  including:    patellar mobilization in all planes (grade III)  Tibial AP grade III-IV  Inferior patellar glide with knee flexion   Medial to lateral tibial mobilization grade II-III for knee flexion    neuromuscular re-education activities to improve: Balance, Coordination, Kinesthetic, Sense, and Proprioception for 12 minutes. The following activities were included:    Supine straight leg raise 3 x 10   Side lying clamshells with green theraband, 3 x 12, 3 second hold, B    therapeutic activities to improve functional performance for 13  minutes, including:  Double leg hoping in place 3x30   Double leg hopping fwd/bwd 3x30  Double leg hopping Left/Right 3x30       Patient Education and Home Exercises     Home Exercises Provided and Patient Education Provided     Education provided:   - HEP education (HEP expanded)  - POC education    Written Home Exercises Provided: Patient instructed to cont prior HEP. Exercises were reviewed and Khloe was able to demonstrate them prior to the end of the session.  Khloe demonstrated good  understanding of the education provided. See EMR under Patient Instructions for exercises provided during therapy sessions    ASSESSMENT     Sara's strength was retested today and found to be about 85% symmetrical to her uninvolved leg. Therapy initiated double leg hopping and single leg loading. She tolerated these well and had increased fatigue in her leg. Therapy will continue to progress as necessary, focusing on more single leg strengthening to further ease the initiation of running.     Khloe Is progressing well towards her goals.   Pt prognosis is Good.     Pt will continue to benefit from skilled outpatient physical therapy to address the deficits listed in the problem list box on initial evaluation, provide pt/family education and to maximize pt's level of independence in the home and community environment.     Pt's spiritual, cultural and educational needs  considered and pt agreeable to plan of care and goals.     Anticipated barriers to physical therapy: none    Goals:  Short Term Goals: 4 weeks. Pt agrees with goals set.  Pt will demonstrate independence and compliance with initial HEP to improve independence and symptom management.   Pt will report Right knee pain </= 2/10 on exertion with knee flexion to demonstrate improved condition and ability to sit comfortably, ambulate and complete transitional movements.    Pt will complete 30 straight leg raises without knee lag to display increased motor control, quad strength and ability to ambulate.  Pt will improve Right knee flexion ROM by >= 50 % to improve tolerance for sitting and ambulation.       Long Term Goals: 8 weeks. Pt agrees with goals set.   Pt will demonstrate independence and compliance with final HEP to continue managing symptoms and developing mobility, motor control and strength.    Pt will improve FOTO score to </= 35% limited to demonstrate improved functional mobility.    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    Pt will be able to complete 30 double leg squats with symmetrical loading of her legs, no knee valgus and no pain to display improved strength and ability to return to her PLOF.  Pt will improve Right knee ROM = to uninvolved side to improve tolerance for ambulation, sitting and squatting to improve ability to return to PLOF.    Pt goal: be able to walk and move it normally; have a normal functioning leg        PLAN   Reasons for Recertification of Therapy:   Sara has deficits to her knee range of motion and strength, this is limiting her from completing her ADLs and participating in higher level task. She could benefit from more time in therapy to improve these deficits.     Updated Certification Period: 12/13/2023 to  01/31/2024  Recommended Treatment Plan: 1-2 times per week for 6 weeks: Electrical Stimulation NMES, Gait Training, Manual Therapy, Moist Heat/ Ice, Neuromuscular Re-ed, Patient Education, Self Care, Therapeutic Activities, and Therapeutic Exercise  Other Recommendations: Develop knee range of motion and quadriceps strength; develop hip strength as tolerated.     Milad Zuluaga, PT, DPT  12/13/2023      I CERTIFY THE NEED FOR THESE SERVICES FURNISHED UNDER THIS PLAN OF TREATMENT AND WHILE UNDER MY CARE    Physician's comments:        Physician's Signature: ___________________________________________________

## 2023-12-13 NOTE — PROGRESS NOTES
[facility]   Physical Therapy Treatment Note     Name: Khloe BAIN [facility] Number: [id number]    Therapy Diagnosis:   Encounter Diagnoses   Name Primary?    Decreased range of motion (ROM) of right knee Yes    Quadriceps weakness        Physician: Cosme Jim IV, MD    Visit Date: 12/13/2023    Physician Orders: PT Eval and Treat   Medical Diagnosis from Referral: S83.511A (ICD-10-CM) - Sprain of anterior cruciate ligament of right knee  Evaluation Date: 10/20/2023  Authorization Period Expiration: 12/31/2023  Plan of Care Expiration: 12/15/2023  Progress Note Due: 11/19/2023  Date of Surgery: 09/29/2023  Visit # / Visits authorized: 13/ 20 (+eval)  FOTO: 2/ 3      Precautions: Standard     PTA Visit #: 0/5     FOTO first follow up: 11/15/2023  FOTO second follow up:     Time In: 10:00  Time Out: 1054  Total Billable Time: 54 minutes    SUBJECTIVE     Pt reports: She woke up about the 30 minutes ago so she is feeling pretty stiff.   She was compliant with home exercise program.  Response to previous treatment: improved knee flexion, less pain with flexion  Functional change: as above    Pain: 0/10 at rest, 8/10 with flexion  Location: right knee      OBJECTIVE     Objective Measures updated at progress report unless specified.     Date Left Right   12/13/2023 49.72 lb 41.82 lb    61.24 lb 62.68 lb    74.36 lb 63.81 lb    63.81/74.36 = 85%    Knee passive range of motion: -3/0/130  Treatment     Post Op date 9/29/23: Sara is  10 weeks  post op. As of 12/132023    Khloe received the treatments listed below:      therapeutic exercises to develop strength, endurance, ROM, and flexibility for 19 minutes including:    Heel slides 3 x12 with 3# weight for overpressure, 3 second hold   Knee extension machine 20#, 3 x 8   Single leg squat, 4 x 8     manual therapy techniques: Joint mobilizations and Soft tissue Mobilization were applied to the: Right knee for 10 minutes,  including:    patellar mobilization in all planes (grade III)  Tibial AP grade III-IV  Inferior patellar glide with knee flexion   Medial to lateral tibial mobilization grade II-III for knee flexion    neuromuscular re-education activities to improve: Balance, Coordination, Kinesthetic, Sense, and Proprioception for 12 minutes. The following activities were included:    Supine straight leg raise 3 x 10   Side lying clamshells with green theraband, 3 x 12, 3 second hold, B    therapeutic activities to improve functional performance for 13  minutes, including:  Double leg hoping in place 3x30   Double leg hopping fwd/bwd 3x30  Double leg hopping Left/Right 3x30       Patient Education and Home Exercises     Home Exercises Provided and Patient Education Provided     Education provided:   - HEP education (HEP expanded)  - POC education    Written Home Exercises Provided: Patient instructed to cont prior HEP. Exercises were reviewed and Khloe was able to demonstrate them prior to the end of the session.  Khloe demonstrated good  understanding of the education provided. See EMR under Patient Instructions for exercises provided during therapy sessions    ASSESSMENT     Sara's strength was retested today and found to be about 85% symmetrical to her uninvolved leg. Therapy initiated double leg hopping and single leg loading. She tolerated these well and had increased fatigue in her leg. Therapy will continue to progress as necessary, focusing on more single leg strengthening to further ease the initiation of running.     Khloe Is progressing well towards her goals.   Pt prognosis is Good.     Pt will continue to benefit from skilled outpatient physical therapy to address the deficits listed in the problem list box on initial evaluation, provide pt/family education and to maximize pt's level of independence in the home and community environment.     Pt's spiritual, cultural and educational needs  considered and pt agreeable to plan of care and goals.     Anticipated barriers to physical therapy: none    Goals:  Short Term Goals: 4 weeks. Pt agrees with goals set.  Pt will demonstrate independence and compliance with initial HEP to improve independence and symptom management.   Pt will report Right knee pain </= 2/10 on exertion with knee flexion to demonstrate improved condition and ability to sit comfortably, ambulate and complete transitional movements.    Pt will complete 30 straight leg raises without knee lag to display increased motor control, quad strength and ability to ambulate.  Pt will improve Right knee flexion ROM by >= 50 % to improve tolerance for sitting and ambulation.       Long Term Goals: 8 weeks. Pt agrees with goals set.   Pt will demonstrate independence and compliance with final HEP to continue managing symptoms and developing mobility, motor control and strength.    Pt will improve FOTO score to </= 35% limited to demonstrate improved functional mobility.    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    Pt will be able to complete 30 double leg squats with symmetrical loading of her legs, no knee valgus and no pain to display improved strength and ability to return to her PLOF.  Pt will improve Right knee ROM = to uninvolved side to improve tolerance for ambulation, sitting and squatting to improve ability to return to PLOF.    Pt goal: be able to walk and move it normally; have a normal functioning leg        PLAN   Reasons for Recertification of Therapy:   Sara has deficits to her knee range of motion and strength, this is limiting her from completing her ADLs and participating in higher level task. She could benefit from more time in therapy to improve these deficits.     Updated Certification Period: 12/13/2023 to  01/31/2024  Recommended Treatment Plan: 1-2 times per week for 6 weeks: Electrical Stimulation NMES, Gait Training, Manual Therapy, Moist Heat/ Ice, Neuromuscular Re-ed, Patient Education, Self Care, Therapeutic Activities, and Therapeutic Exercise  Other Recommendations: Develop knee range of motion and quadriceps strength; develop hip strength as tolerated.     Milad Zuluaga, PT, DPT  12/13/2023      I CERTIFY THE NEED FOR THESE SERVICES FURNISHED UNDER THIS PLAN OF TREATMENT AND WHILE UNDER MY CARE    Physician's comments:        Physician's Signature: ___________________________________________________

## 2023-12-18 ENCOUNTER — CLINICAL SUPPORT (OUTPATIENT)
Dept: REHABILITATION | Facility: HOSPITAL | Age: 23
End: 2023-12-18
Payer: MEDICAID

## 2023-12-18 DIAGNOSIS — M25.661 DECREASED RANGE OF MOTION (ROM) OF RIGHT KNEE: Primary | ICD-10-CM

## 2023-12-18 DIAGNOSIS — M62.81 QUADRICEPS WEAKNESS: ICD-10-CM

## 2023-12-18 PROCEDURE — 97110 THERAPEUTIC EXERCISES: CPT | Mod: PN

## 2023-12-18 NOTE — PROGRESS NOTES
[facility]   Physical Therapy Treatment Note     Name: Khloe BAIN [facility] Number: [id number]    Therapy Diagnosis:   Encounter Diagnoses   Name Primary?    Decreased range of motion (ROM) of right knee Yes    Quadriceps weakness        Physician: Cosme Jim IV, MD    Visit Date: 12/18/2023    Physician Orders: PT Eval and Treat   Medical Diagnosis from Referral: S83.511A (ICD-10-CM) - Sprain of anterior cruciate ligament of right knee  Evaluation Date: 10/20/2023  Authorization Period Expiration: 12/31/2023  Plan of Care Expiration: 01/31/2024  Progress Note Due: 11/19/2023  Date of Surgery: 09/29/2023  Visit # / Visits authorized: 14/ 20 (+eval)  FOTO: 2/ 3      Precautions: Standard     PTA Visit #: 0/5     FOTO first follow up: 11/15/2023  FOTO second follow up:     Time In: 8:59  Time Out: 9:55  Total Billable Time: 56 minutes    SUBJECTIVE     Pt reports: She was sore for a few days following the hopping, she had trouble walking a good bit after too.   She was compliant with home exercise program.  Response to previous treatment: improved knee flexion, less pain with flexion  Functional change: as above    Pain: 0/10 at rest, 8/10 with flexion  Location: right knee      OBJECTIVE     Objective Measures updated at progress report unless specified.     Date Left Right   12/13/2023 49.72 lb 41.82 lb    61.24 lb 62.68 lb    74.36 lb 63.81 lb    63.81/74.36 = 85%    Knee passive range of motion: -3/0/130  Treatment     Post Op date 9/29/23: Sara is  10 weeks and 5 days post op. As of 12/15/2023    Khloe received the treatments listed below:      therapeutic exercises to develop strength, endurance, ROM, and flexibility for 24 minutes including:  Heel prop x3 minutes  Knee extension machine 35#, 4 x 8   Single leg squat, 4 x 8   Double leg shuttle 75#, 3 x 8   Single leg shuttle 37#, 3 x 8     manual therapy techniques: Joint mobilizations and Soft tissue Mobilization  were applied to the: Right knee for 10 minutes, including:    patellar mobilization in all planes (grade III)  Tibial AP grade III-IV  Tibial internal rotation mobilization with knee flexion grade III  Inferior patellar glide with knee flexion   Medial to lateral tibial mobilization grade II-III for knee flexion    neuromuscular re-education activities to improve: Balance, Coordination, Kinesthetic, Sense, and Proprioception for 12 minutes. The following activities were included:  Quad set x30  Supine straight leg raise 3 x 10   Side lying clamshells with green theraband, 3 x 12, 3 second hold, B    therapeutic activities to improve functional performance for 10  minutes, including:    Double leg hopping on shuttle 25#, 3 x 30     Patient Education and Home Exercises     Home Exercises Provided and Patient Education Provided     Education provided:   - HEP education (HEP expanded)  - POC education    Written Home Exercises Provided: Patient instructed to cont prior HEP. Exercises were reviewed and Khloe was able to demonstrate them prior to the end of the session.  Khloe demonstrated good  understanding of the education provided. See EMR under Patient Instructions for exercises provided during therapy sessions    ASSESSMENT     Sara completed therapy with no complications. Therapy focused on developing her quad strength and regressed hopping to hopping on the shuttle. Her strength is improving but she will need to increase her tolerance to single leg activities to further introduce running progressions. Therapy will continue to progress as tolerated.     Khloe Is progressing well towards her goals.   Pt prognosis is Good.     Pt will continue to benefit from skilled outpatient physical therapy to address the deficits listed in the problem list box on initial evaluation, provide pt/family education and to maximize pt's level of independence in the home and community environment.     Pt's  spiritual, cultural and educational needs considered and pt agreeable to plan of care and goals.     Anticipated barriers to physical therapy: none    Goals:  Short Term Goals: 4 weeks. Pt agrees with goals set.  Pt will demonstrate independence and compliance with initial HEP to improve independence and symptom management.   Pt will report Right knee pain </= 2/10 on exertion with knee flexion to demonstrate improved condition and ability to sit comfortably, ambulate and complete transitional movements.    Pt will complete 30 straight leg raises without knee lag to display increased motor control, quad strength and ability to ambulate.  Pt will improve Right knee flexion ROM by >= 50 % to improve tolerance for sitting and ambulation.       Long Term Goals: 8 weeks. Pt agrees with goals set.   Pt will demonstrate independence and compliance with final HEP to continue managing symptoms and developing mobility, motor control and strength.    Pt will improve FOTO score to </= 35% limited to demonstrate improved functional mobility.    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    Pt will be able to complete 30 double leg squats with symmetrical loading of her legs, no knee valgus and no pain to display improved strength and ability to return to her PLOF.  Pt will improve Right knee ROM = to uninvolved side to improve tolerance for ambulation, sitting and squatting to improve ability to return to PLOF.    Pt goal: be able to walk and move it normally; have a normal functioning leg.        PLAN   Develop knee range of motion and quadriceps strength; develop hip strength as tolerated.     Milad Zuluaga, PT, DPT  12/18/2023    ___________________________________________________

## 2023-12-27 ENCOUNTER — CLINICAL SUPPORT (OUTPATIENT)
Dept: REHABILITATION | Facility: HOSPITAL | Age: 23
End: 2023-12-27
Payer: MEDICAID

## 2023-12-27 DIAGNOSIS — M25.661 DECREASED RANGE OF MOTION (ROM) OF RIGHT KNEE: Primary | ICD-10-CM

## 2023-12-27 DIAGNOSIS — M62.81 QUADRICEPS WEAKNESS: ICD-10-CM

## 2023-12-27 PROCEDURE — 97110 THERAPEUTIC EXERCISES: CPT | Mod: PN

## 2023-12-27 NOTE — PROGRESS NOTES
[facility]   Physical Therapy Treatment Note     Name: Khloe BAIN [facility] Number: [id number]    Therapy Diagnosis:   Encounter Diagnoses   Name Primary?    Decreased range of motion (ROM) of right knee Yes    Quadriceps weakness        Physician: Cosme Jim IV, MD    Visit Date: 12/27/2023    Physician Orders: PT Eval and Treat   Medical Diagnosis from Referral: S83.511A (ICD-10-CM) - Sprain of anterior cruciate ligament of right knee  Evaluation Date: 10/20/2023  Authorization Period Expiration: 12/31/2023  Plan of Care Expiration: 01/31/2024  Progress Note Due: 11/19/2023  Date of Surgery: 09/29/2023  Visit # / Visits authorized: 15/ 20 (+eval)  FOTO: 2/ 3      Precautions: Standard     PTA Visit #: 0/5     FOTO first follow up: 11/15/2023  FOTO second follow up:     Time In: 9:00  Time Out: 9:53  Total Billable Time: 53 minutes    SUBJECTIVE     Pt reports:Her knee has been a little swollen, but she has been doing a lot of su things lately.    She was compliant with home exercise program.  Response to previous treatment: improved knee flexion, less pain with flexion  Functional change: as above    Pain: 0/10 at rest, 8/10 with flexion  Location: right knee      OBJECTIVE     Objective Measures updated at progress report unless specified.     Date Left Right   12/13/2023 49.72 lb 41.82 lb    61.24 lb 62.68 lb    74.36 lb 63.81 lb    63.81/74.36 = 85%    Knee passive range of motion: -3/0/130  Treatment     Post Op date 9/29/23: Sara is  12 weeks and 5 days post op. As of 12/27/2023    Khloe received the treatments listed below:      therapeutic exercises to develop strength, endurance, ROM, and flexibility for 40 minutes including:  Upright bike 8 minutes, level 2   Heel prop x3 minutes  4 x 35 second hamstring stretch   Knee extension machine 35#, 4 x 8   Single leg squat, 2 x 8   Split squat with 4 in step, 3 x8  Double leg shuttle 75#, 3 x 8   Single leg  shuttle 37#, 3 x 8     manual therapy techniques: Joint mobilizations and Soft tissue Mobilization were applied to the: Right knee for 6 minutes, including:    Tibial hinge mobilization    neuromuscular re-education activities to improve: Balance, Coordination, Kinesthetic, Sense, and Proprioception for 12 minutes. The following activities were included:  Quad set x30  Supine straight leg raise 3 x 12, 2 #   Standing clamshells with green theraband 3 x 12, 3 second hold  Lateral walking with red theraband x2, 10 yds     therapeutic activities to improve functional performance for 5  minutes, includinin hop down, x20, B     Patient Education and Home Exercises     Home Exercises Provided and Patient Education Provided     Education provided:   - HEP education (HEP expanded)  - POC education    Written Home Exercises Provided: Patient instructed to cont prior HEP. Exercises were reviewed and Khloe was able to demonstrate them prior to the end of the session.  Khloe demonstrated good  understanding of the education provided. See EMR under Patient Instructions for exercises provided during therapy sessions    ASSESSMENT     Sara completed therapy with no complications. Therapy focused on developing her hip and quadriceps strength; she tolerated all progressions well. Therapy has re-initiated all return to run activities; loading her up single leg strength and progressing hopping. Therapy will decrease her to one time a week and progress as tolerated.     Khloe Is progressing well towards her goals.   Pt prognosis is Good.     Pt will continue to benefit from skilled outpatient physical therapy to address the deficits listed in the problem list box on initial evaluation, provide pt/family education and to maximize pt's level of independence in the home and community environment.     Pt's spiritual, cultural and educational needs considered and pt agreeable to plan of care and goals.      Anticipated barriers to physical therapy: none    Goals:  Short Term Goals: 4 weeks. Pt agrees with goals set.  Pt will demonstrate independence and compliance with initial HEP to improve independence and symptom management.   Pt will report Right knee pain </= 2/10 on exertion with knee flexion to demonstrate improved condition and ability to sit comfortably, ambulate and complete transitional movements.    Pt will complete 30 straight leg raises without knee lag to display increased motor control, quad strength and ability to ambulate.  Pt will improve Right knee flexion ROM by >= 50 % to improve tolerance for sitting and ambulation.       Long Term Goals: 8 weeks. Pt agrees with goals set.   Pt will demonstrate independence and compliance with final HEP to continue managing symptoms and developing mobility, motor control and strength.    Pt will improve FOTO score to </= 35% limited to demonstrate improved functional mobility.    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    Pt will be able to complete 30 double leg squats with symmetrical loading of her legs, no knee valgus and no pain to display improved strength and ability to return to her PLOF.  Pt will improve Right knee ROM = to uninvolved side to improve tolerance for ambulation, sitting and squatting to improve ability to return to PLOF.    Pt goal: be able to walk and move it normally; have a normal functioning leg.        PLAN   Develop knee range of motion and quadriceps strength; develop hip strength as tolerated.     Milad Zuluaga, PT, DPT  12/27/2023

## 2024-01-03 ENCOUNTER — CLINICAL SUPPORT (OUTPATIENT)
Dept: REHABILITATION | Facility: HOSPITAL | Age: 24
End: 2024-01-03
Payer: MEDICAID

## 2024-01-03 DIAGNOSIS — M25.661 DECREASED RANGE OF MOTION (ROM) OF RIGHT KNEE: Primary | ICD-10-CM

## 2024-01-03 DIAGNOSIS — M62.81 QUADRICEPS WEAKNESS: ICD-10-CM

## 2024-01-03 PROCEDURE — 97110 THERAPEUTIC EXERCISES: CPT | Mod: PN

## 2024-01-03 NOTE — PROGRESS NOTES
[facility]   Physical Therapy Treatment Note     Name: Khloe BAIN [facility] Number: [id number]    Therapy Diagnosis:   Encounter Diagnoses   Name Primary?    Decreased range of motion (ROM) of right knee Yes    Quadriceps weakness        Physician: Cosme Jim IV, MD    Visit Date: 1/3/2024    Physician Orders: PT Eval and Treat   Medical Diagnosis from Referral: S83.511A (ICD-10-CM) - Sprain of anterior cruciate ligament of right knee  Evaluation Date: 10/20/2023  Authorization Period Expiration: 04/30/2024  Plan of Care Expiration: 01/31/2024  Progress Note Due: 11/19/2023  Date of Surgery: 09/29/2023  Visit # / Visits authorized: 1/12 (+eval)  FOTO: 2/ 3      Precautions: Standard     PTA Visit #: 0/5     FOTO first follow up: 11/15/2023  FOTO second follow up:     Time In: 8:56  Time Out: 9:43  Total Billable Time: 47 minutes    SUBJECTIVE     Pt reports:Her knee has been a little swollen, but she has been doing a lot of su things lately.    She was compliant with home exercise program.  Response to previous treatment: improved knee flexion, less pain with flexion  Functional change: as above    Pain: 0/10 at rest, 8/10 with flexion  Location: right knee      OBJECTIVE     Objective Measures updated at progress report unless specified.     Date Left Right   12/13/2023 49.72 lb 41.82 lb    61.24 lb 62.68 lb    74.36 lb 63.81 lb    63.81/74.36 = 85%    Knee passive range of motion: -3/0/130  Treatment     Post Op date 9/29/23: Sara is  13 weeks and 5 days post op. As of 01/03/2024    Khloe received the treatments listed below:      therapeutic exercises to develop strength, endurance, ROM, and flexibility for 11 minutes including:    Single leg squat with blue sports cord, 3 x 8, bilaterally   Split squat with 4 in step, 3 x5, 10# KB in contralateral hand     manual therapy techniques: Joint mobilizations and Soft tissue Mobilization were applied to the: Right  knee for 0 minutes, including:    Tibial hinge mobilization    neuromuscular re-education activities to improve: Balance, Coordination, Kinesthetic, Sense, and Proprioception for 15 minutes. The following activities were included:  Supine straight leg raise 3 x 12, 2#   Standing clamshells with green theraband 3 x 12, 3 second hold  Lateral walking with red theraband x4, 10 yds     therapeutic activities to improve functional performance for 21 minutes, including:  Double leg hopping x30  Single leg hopping x20  Jogging 10 yds, x8; comfortable pace, less than 50% of top speed    Patient Education and Home Exercises     Home Exercises Provided and Patient Education Provided     Education provided:   - HEP education (HEP expanded)  - POC education    Written Home Exercises Provided: Patient instructed to cont prior HEP. Exercises were reviewed and Khloe was able to demonstrate them prior to the end of the session.  Khloe demonstrated good  understanding of the education provided. See EMR under Patient Instructions for exercises provided during therapy sessions    ASSESSMENT     Sara is doing well at this time; Therapy progressed her strengthening and initiated jogging on today. She tolerated all progressions well and displayed symmetrical loading while jogging. She reported no pain or discomfort, just an abnormal feeling. Overall she is doing really well, therapy will look retest her quad strength next week and continue to progress as tolerated.     Khloe Is progressing well towards her goals.   Pt prognosis is Good.     Pt will continue to benefit from skilled outpatient physical therapy to address the deficits listed in the problem list box on initial evaluation, provide pt/family education and to maximize pt's level of independence in the home and community environment.     Pt's spiritual, cultural and educational needs considered and pt agreeable to plan of care and goals.     Anticipated  barriers to physical therapy: none    Goals:  Short Term Goals: 4 weeks. Pt agrees with goals set.  Pt will demonstrate independence and compliance with initial HEP to improve independence and symptom management.   Pt will report Right knee pain </= 2/10 on exertion with knee flexion to demonstrate improved condition and ability to sit comfortably, ambulate and complete transitional movements.  MET  Pt will complete 30 straight leg raises without knee lag to display increased motor control, quad strength and ability to ambulate. MET  Pt will improve Right knee flexion ROM by >= 50 % to improve tolerance for sitting and ambulation.  MET     Long Term Goals: 8 weeks. Pt agrees with goals set.   Pt will demonstrate independence and compliance with final HEP to continue managing symptoms and developing mobility, motor control and strength.    Pt will improve FOTO score to </= 35% limited to demonstrate improved functional mobility.    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 MET   Pt will be able to complete 30 double leg squats with symmetrical loading of her legs, no knee valgus and no pain to display improved strength and ability to return to her PLOF. MET  Pt will improve Right knee ROM = to uninvolved side to improve tolerance for ambulation, sitting and squatting to improve ability to return to PLOF.    Pt goal: be able to walk and move it normally; have a normal functioning leg.        PLAN   Develop knee range of motion and quadriceps strength; develop hip strength as tolerated.     Milad Zuluaga, PT, DPT  1/3/2024

## 2024-04-08 NOTE — PATIENT INSTRUCTIONS
You may repeat Maxalt in 2 hr but maximun 2 doses/24 hr.    Switch from IMITREX to MAXALT 10 mg plus ibuprofen 600 mg. at headache onset. No more than 3 doses a week and 1 dose per day.   -Topamax 100 Qdaily for prevention.  -Add Migravent for prevention.       Yes

## 2025-03-03 ENCOUNTER — OFFICE VISIT (OUTPATIENT)
Dept: URGENT CARE | Facility: CLINIC | Age: 25
End: 2025-03-03
Payer: MEDICAID

## 2025-03-03 VITALS
WEIGHT: 145 LBS | HEIGHT: 67 IN | RESPIRATION RATE: 16 BRPM | BODY MASS INDEX: 22.76 KG/M2 | TEMPERATURE: 99 F | DIASTOLIC BLOOD PRESSURE: 78 MMHG | SYSTOLIC BLOOD PRESSURE: 111 MMHG | OXYGEN SATURATION: 98 % | HEART RATE: 63 BPM

## 2025-03-03 DIAGNOSIS — R05.9 COUGH, UNSPECIFIED TYPE: ICD-10-CM

## 2025-03-03 DIAGNOSIS — Z20.822 COVID-19 VIRUS NOT DETECTED: ICD-10-CM

## 2025-03-03 DIAGNOSIS — R89.4 INFLUENZA A VIRUS NOT DETECTED: ICD-10-CM

## 2025-03-03 DIAGNOSIS — J06.9 UPPER RESPIRATORY TRACT INFECTION, UNSPECIFIED TYPE: Primary | ICD-10-CM

## 2025-03-03 LAB
CTP QC/QA: YES
CTP QC/QA: YES
FLUAV AG NPH QL: NEGATIVE
FLUBV AG NPH QL: NEGATIVE
SARS CORONAVIRUS 2 ANTIGEN: NEGATIVE

## 2025-03-03 PROCEDURE — 99204 OFFICE O/P NEW MOD 45 MIN: CPT | Mod: S$GLB,,,

## 2025-03-03 PROCEDURE — 87804 INFLUENZA ASSAY W/OPTIC: CPT | Mod: QW,,,

## 2025-03-03 PROCEDURE — 87811 SARS-COV-2 COVID19 W/OPTIC: CPT | Mod: QW,S$GLB,,

## 2025-03-03 PROCEDURE — 71046 X-RAY EXAM CHEST 2 VIEWS: CPT | Mod: S$GLB,,, | Performed by: RADIOLOGY

## 2025-03-03 RX ORDER — AZITHROMYCIN 250 MG/1
TABLET, FILM COATED ORAL
Qty: 6 TABLET | Refills: 0 | Status: SHIPPED | OUTPATIENT
Start: 2025-03-03

## 2025-03-03 NOTE — PROGRESS NOTES
"Subjective:      Patient ID: Khloe Almeida is a 24 y.o. female.    Vitals:  height is 5' 7" (1.702 m) and weight is 65.8 kg (145 lb). Her oral temperature is 98.9 °F (37.2 °C). Her blood pressure is 111/78 and her pulse is 63. Her respiration is 16 and oxygen saturation is 98%.     Chief Complaint: Cough    Patient in clinic with a complaint of bilateral temporal pain, congestion, and nonproductive cough for 1 week.  Denies fever, but states felt subjectively hot last night.  Had a ninety-nine T-max.  She has been using OTC meds with little improvement.  Denies ill contacts at home or at work.  Does endorse history of seasonal environmental allergies as well as recurrent sinus infections.  Denies changes in taste, or smell, facial pains, rust colored mucus, or postnasal drip.    Cough  This is a new problem. The current episode started in the past 7 days. Associated symptoms include headaches, nasal congestion and rhinorrhea. Pertinent negatives include no chest pain, ear congestion, fever, myalgias, postnasal drip, sore throat or shortness of breath. Associated symptoms comments: Runny nose. She has tried OTC cough suppressant (nasal spray) for the symptoms. Her past medical history is significant for environmental allergies. There is no history of asthma.       Constitution: Negative for fever.   HENT:  Positive for congestion. Negative for postnasal drip, sinus pain, sinus pressure and sore throat.    Neck: neck negative.   Cardiovascular:  Negative for chest pain.   Eyes: Negative.    Respiratory:  Positive for cough. Negative for sputum production and shortness of breath.    Gastrointestinal: Negative.    Endocrine: negative.   Genitourinary: Negative.    Musculoskeletal:  Negative for muscle ache.   Allergic/Immunologic: Positive for environmental allergies, seasonal allergies and recurrent sinus infections.   Neurological:  Positive for headaches and history of migraines.   Hematologic/Lymphatic: " Negative.    Psychiatric/Behavioral: Negative.        Objective:     Physical Exam   Constitutional: She is oriented to person, place, and time. She is cooperative.  Non-toxic appearance. She does not appear ill. No distress.   HENT:   Head: Normocephalic and atraumatic.   Ears:   Right Ear: External ear normal.   Left Ear: External ear normal.   Nose: Congestion present. Right sinus exhibits no maxillary sinus tenderness and no frontal sinus tenderness. Left sinus exhibits no maxillary sinus tenderness and no frontal sinus tenderness.   Mouth/Throat: Uvula is midline. Mucous membranes are dry. Oropharyngeal exudate present.   Eyes: Conjunctivae and lids are normal. Pupils are equal, round, and reactive to light. Extraocular movement intact   Neck: Trachea normal and phonation normal. Neck supple.   Cardiovascular: Normal rate, regular rhythm, normal heart sounds and normal pulses.   No murmur heard.Exam reveals no gallop.   Pulmonary/Chest: Effort normal and breath sounds normal. No stridor. She has no wheezes. She has no rhonchi. She has no rales.   Abdominal: Normal appearance. Soft. flat abdomen There is no abdominal tenderness.   Musculoskeletal: Normal range of motion.         General: Normal range of motion.   Lymphadenopathy:     She has no cervical adenopathy.   Neurological: no focal deficit. She is alert, oriented to person, place, and time and at baseline. She has normal motor skills, normal sensation and intact cranial nerves (2-12). Gait and coordination normal. GCS eye subscore is 4. GCS verbal subscore is 5. GCS motor subscore is 6.   Skin: Skin is warm, dry, not diaphoretic and no rash. Capillary refill takes 2 to 3 seconds.   Psychiatric: She experiences Normal attention and Normal perception. Her speech is normal and behavior is normal. Mood, judgment and thought content normal.   Nursing note and vitals reviewed.      Assessment:     1. Upper respiratory tract infection, unspecified type    2.  Cough, unspecified type    3. Influenza A virus not detected    4. COVID-19 virus not detected        Plan:       Upper respiratory tract infection, unspecified type  -     azithromycin (Z-BELEN) 250 MG tablet; Take 2 tablets by mouth on day 1; Take 1 tablet by mouth on days 2-5  Dispense: 6 tablet; Refill: 0  -     pyrilamine-phenylephrine-DM 12.5-5-7.5 mg/5 mL Liqd; Take 5 mLs by mouth every 6 (six) hours as needed (Cough and congestion).  Dispense: 118 mL; Refill: 0    Cough, unspecified type  -     XR CHEST PA AND LATERAL; Future; Expected date: 03/03/2025  -     POCT Influenza A/B  -     SARS Coronavirus 2 Antigen, POCT Manual Read    Influenza A virus not detected    COVID-19 virus not detected          Medical Decision Making:   Initial Assessment:   In clinic complaint of URI symptoms for approximately 1 week. She has been afebrile for the duration of her illness.  States 99 T-max.  Supportive treatment has been ineffective.  No maxillary, or frontal sinus pain.  No red flag symptoms.  Generally favorable exam.  Given her duration of illness will provide azithromycin for possible secondary etiology.  Patient is also well outside of treatment range for viral illnesses.  Differential Diagnosis:   Viral URI, pneumonia, sinusitis  Clinical Tests:   Lab Tests: Ordered and Reviewed  Radiological Study: Ordered and Reviewed  Urgent Care Management:  Chest x-ray reviewed no obvious disease.

## 2025-03-03 NOTE — LETTER
March 3, 2025      [facility]  [address]  [facility] [address]  Phone: [phone]       Patient: Khloe Almeida   YOB: 2000  Date of Visit: 03/03/2025    To Whom It May Concern:    Case Almeida  was at [facility] on 03/03/2025. The patient may return to work/school on 3/4/25 with no restrictions. If you have any questions or concerns, or if I can be of further assistance, please do not hesitate to contact me.    Sincerely,    KHANH Shields